# Patient Record
Sex: FEMALE | Race: WHITE
[De-identification: names, ages, dates, MRNs, and addresses within clinical notes are randomized per-mention and may not be internally consistent; named-entity substitution may affect disease eponyms.]

---

## 2017-03-27 ENCOUNTER — APPOINTMENT (OUTPATIENT)
Dept: ELECTROPHYSIOLOGY | Facility: CLINIC | Age: 77
End: 2017-03-27

## 2017-04-06 ENCOUNTER — APPOINTMENT (OUTPATIENT)
Dept: ELECTROPHYSIOLOGY | Facility: CLINIC | Age: 77
End: 2017-04-06

## 2017-04-21 ENCOUNTER — APPOINTMENT (OUTPATIENT)
Dept: ELECTROPHYSIOLOGY | Facility: CLINIC | Age: 77
End: 2017-04-21

## 2017-05-24 ENCOUNTER — APPOINTMENT (OUTPATIENT)
Dept: ELECTROPHYSIOLOGY | Facility: CLINIC | Age: 77
End: 2017-05-24

## 2017-06-06 ENCOUNTER — APPOINTMENT (OUTPATIENT)
Dept: ELECTROPHYSIOLOGY | Facility: CLINIC | Age: 77
End: 2017-06-06

## 2017-06-06 VITALS
HEIGHT: 66 IN | SYSTOLIC BLOOD PRESSURE: 160 MMHG | BODY MASS INDEX: 34.07 KG/M2 | DIASTOLIC BLOOD PRESSURE: 70 MMHG | WEIGHT: 212 LBS | HEART RATE: 68 BPM

## 2017-06-23 ENCOUNTER — APPOINTMENT (OUTPATIENT)
Dept: ELECTROPHYSIOLOGY | Facility: CLINIC | Age: 77
End: 2017-06-23

## 2017-07-24 ENCOUNTER — APPOINTMENT (OUTPATIENT)
Dept: ELECTROPHYSIOLOGY | Facility: CLINIC | Age: 77
End: 2017-07-24
Payer: MEDICARE

## 2017-07-24 PROCEDURE — 93299: CPT

## 2017-07-24 PROCEDURE — 93298 REM INTERROG DEV EVAL SCRMS: CPT

## 2017-08-22 ENCOUNTER — APPOINTMENT (OUTPATIENT)
Dept: ELECTROPHYSIOLOGY | Facility: CLINIC | Age: 77
End: 2017-08-22
Payer: MEDICARE

## 2017-08-22 PROCEDURE — 93298 REM INTERROG DEV EVAL SCRMS: CPT

## 2017-08-22 PROCEDURE — 93299: CPT

## 2017-09-21 ENCOUNTER — APPOINTMENT (OUTPATIENT)
Dept: ELECTROPHYSIOLOGY | Facility: CLINIC | Age: 77
End: 2017-09-21
Payer: MEDICARE

## 2017-09-21 PROCEDURE — 93298 REM INTERROG DEV EVAL SCRMS: CPT

## 2017-09-21 PROCEDURE — 93299: CPT

## 2017-10-23 ENCOUNTER — APPOINTMENT (OUTPATIENT)
Dept: ELECTROPHYSIOLOGY | Facility: CLINIC | Age: 77
End: 2017-10-23
Payer: MEDICARE

## 2017-10-23 PROCEDURE — 93299: CPT

## 2017-10-23 PROCEDURE — 93298 REM INTERROG DEV EVAL SCRMS: CPT

## 2017-11-21 ENCOUNTER — APPOINTMENT (OUTPATIENT)
Dept: ELECTROPHYSIOLOGY | Facility: CLINIC | Age: 77
End: 2017-11-21
Payer: MEDICARE

## 2017-11-21 PROCEDURE — 93298 REM INTERROG DEV EVAL SCRMS: CPT

## 2017-11-21 PROCEDURE — 93299: CPT

## 2017-12-12 ENCOUNTER — APPOINTMENT (OUTPATIENT)
Dept: ELECTROPHYSIOLOGY | Facility: CLINIC | Age: 77
End: 2017-12-12
Payer: MEDICARE

## 2017-12-12 VITALS — HEART RATE: 72 BPM | WEIGHT: 212 LBS | HEIGHT: 66 IN | BODY MASS INDEX: 34.07 KG/M2

## 2017-12-12 PROCEDURE — 93290 INTERROG DEV EVAL ICPMS IP: CPT

## 2017-12-21 ENCOUNTER — APPOINTMENT (OUTPATIENT)
Dept: ELECTROPHYSIOLOGY | Facility: CLINIC | Age: 77
End: 2017-12-21
Payer: MEDICARE

## 2017-12-21 DIAGNOSIS — Z95.818 PRESENCE OF OTHER CARDIAC IMPLANTS AND GRAFTS: ICD-10-CM

## 2017-12-21 PROCEDURE — 93298 REM INTERROG DEV EVAL SCRMS: CPT

## 2017-12-21 PROCEDURE — 93299: CPT

## 2017-12-26 PROBLEM — Z95.818 PRESENCE OF CARDIAC DEVICE: Status: ACTIVE | Noted: 2017-03-29

## 2018-01-03 ENCOUNTER — OUTPATIENT (OUTPATIENT)
Dept: OUTPATIENT SERVICES | Facility: HOSPITAL | Age: 78
LOS: 1 days | Discharge: HOME | End: 2018-01-03
Payer: MEDICARE

## 2018-01-03 VITALS
RESPIRATION RATE: 18 BRPM | TEMPERATURE: 97 F | SYSTOLIC BLOOD PRESSURE: 138 MMHG | HEART RATE: 72 BPM | OXYGEN SATURATION: 96 % | DIASTOLIC BLOOD PRESSURE: 68 MMHG

## 2018-01-03 VITALS
HEART RATE: 74 BPM | SYSTOLIC BLOOD PRESSURE: 140 MMHG | HEIGHT: 66 IN | RESPIRATION RATE: 18 BRPM | OXYGEN SATURATION: 98 % | TEMPERATURE: 97 F | DIASTOLIC BLOOD PRESSURE: 66 MMHG | WEIGHT: 205.91 LBS

## 2018-01-03 DIAGNOSIS — R55 SYNCOPE AND COLLAPSE: ICD-10-CM

## 2018-01-03 PROCEDURE — 33282: CPT

## 2018-01-03 RX ORDER — VANCOMYCIN HCL 1 G
500 VIAL (EA) INTRAVENOUS ONCE
Qty: 0 | Refills: 0 | Status: COMPLETED | OUTPATIENT
Start: 2018-01-03 | End: 2018-01-03

## 2018-01-03 RX ADMIN — Medication 100 MILLIGRAM(S): at 07:55

## 2018-01-03 NOTE — PACU DISCHARGE NOTE - COMMENTS
Discharge instruction given to patient. Verbalized understanding. All belongings are with the patient. Left the unit at 0835.

## 2018-01-05 ENCOUNTER — OUTPATIENT (OUTPATIENT)
Dept: OUTPATIENT SERVICES | Facility: HOSPITAL | Age: 78
LOS: 1 days | Discharge: HOME | End: 2018-01-05
Payer: MEDICARE

## 2018-01-05 DIAGNOSIS — Z95.818 PRESENCE OF OTHER CARDIAC IMPLANTS AND GRAFTS: Chronic | ICD-10-CM

## 2018-01-05 DIAGNOSIS — K63.5 POLYP OF COLON: ICD-10-CM

## 2018-01-05 DIAGNOSIS — Z88.2 ALLERGY STATUS TO SULFONAMIDES: ICD-10-CM

## 2018-01-05 DIAGNOSIS — Z98.890 OTHER SPECIFIED POSTPROCEDURAL STATES: Chronic | ICD-10-CM

## 2018-01-05 DIAGNOSIS — J44.9 CHRONIC OBSTRUCTIVE PULMONARY DISEASE, UNSPECIFIED: ICD-10-CM

## 2018-01-05 DIAGNOSIS — J45.909 UNSPECIFIED ASTHMA, UNCOMPLICATED: ICD-10-CM

## 2018-01-05 DIAGNOSIS — Z01.818 ENCOUNTER FOR OTHER PREPROCEDURAL EXAMINATION: ICD-10-CM

## 2018-01-05 DIAGNOSIS — Z98.49 CATARACT EXTRACTION STATUS, UNSPECIFIED EYE: Chronic | ICD-10-CM

## 2018-01-05 DIAGNOSIS — Z12.11 ENCOUNTER FOR SCREENING FOR MALIGNANT NEOPLASM OF COLON: ICD-10-CM

## 2018-01-05 DIAGNOSIS — K57.30 DIVERTICULOSIS OF LARGE INTESTINE WITHOUT PERFORATION OR ABSCESS WITHOUT BLEEDING: ICD-10-CM

## 2018-01-05 DIAGNOSIS — Z90.49 ACQUIRED ABSENCE OF OTHER SPECIFIED PARTS OF DIGESTIVE TRACT: Chronic | ICD-10-CM

## 2018-01-05 DIAGNOSIS — Z86.010 PERSONAL HISTORY OF COLONIC POLYPS: ICD-10-CM

## 2018-01-05 DIAGNOSIS — Z88.0 ALLERGY STATUS TO PENICILLIN: ICD-10-CM

## 2018-01-05 LAB
ANION GAP SERPL CALC-SCNC: 5 MMOL/L — SIGNIFICANT CHANGE UP (ref 5–17)
BASOPHILS # BLD AUTO: 0 K/UL — SIGNIFICANT CHANGE UP (ref 0–0.2)
BASOPHILS NFR BLD AUTO: 0.9 % — SIGNIFICANT CHANGE UP (ref 0–2)
BUN SERPL-MCNC: 27 MG/DL — HIGH (ref 7–23)
CALCIUM SERPL-MCNC: 8.9 MG/DL — SIGNIFICANT CHANGE UP (ref 8.5–10.1)
CHLORIDE SERPL-SCNC: 105 MMOL/L — SIGNIFICANT CHANGE UP (ref 96–108)
CO2 SERPL-SCNC: 29 MMOL/L — SIGNIFICANT CHANGE UP (ref 22–31)
CREAT SERPL-MCNC: 0.88 MG/DL — SIGNIFICANT CHANGE UP (ref 0.5–1.3)
EOSINOPHIL # BLD AUTO: 0.1 K/UL — SIGNIFICANT CHANGE UP (ref 0–0.5)
EOSINOPHIL NFR BLD AUTO: 2.9 % — SIGNIFICANT CHANGE UP (ref 0–6)
GLUCOSE SERPL-MCNC: 96 MG/DL — SIGNIFICANT CHANGE UP (ref 70–99)
HCT VFR BLD CALC: 39.4 % — SIGNIFICANT CHANGE UP (ref 34.5–45)
HGB BLD-MCNC: 12.6 G/DL — SIGNIFICANT CHANGE UP (ref 11.5–15.5)
LYMPHOCYTES # BLD AUTO: 0.8 K/UL — LOW (ref 1–3.3)
LYMPHOCYTES # BLD AUTO: 16.6 % — SIGNIFICANT CHANGE UP (ref 13–44)
MCHC RBC-ENTMCNC: 26 PG — LOW (ref 27–34)
MCHC RBC-ENTMCNC: 31.9 GM/DL — LOW (ref 32–36)
MCV RBC AUTO: 81.4 FL — SIGNIFICANT CHANGE UP (ref 80–100)
MONOCYTES # BLD AUTO: 0.5 K/UL — SIGNIFICANT CHANGE UP (ref 0–0.9)
MONOCYTES NFR BLD AUTO: 10.4 % — SIGNIFICANT CHANGE UP (ref 2–14)
NEUTROPHILS # BLD AUTO: 3.1 K/UL — SIGNIFICANT CHANGE UP (ref 1.8–7.4)
NEUTROPHILS NFR BLD AUTO: 69.1 % — SIGNIFICANT CHANGE UP (ref 43–77)
PLATELET # BLD AUTO: 185 K/UL — SIGNIFICANT CHANGE UP (ref 150–400)
POTASSIUM SERPL-MCNC: 4.7 MMOL/L — SIGNIFICANT CHANGE UP (ref 3.5–5.3)
POTASSIUM SERPL-SCNC: 4.7 MMOL/L — SIGNIFICANT CHANGE UP (ref 3.5–5.3)
RBC # BLD: 4.84 M/UL — SIGNIFICANT CHANGE UP (ref 3.8–5.2)
RBC # FLD: 13.6 % — SIGNIFICANT CHANGE UP (ref 10.3–14.5)
SODIUM SERPL-SCNC: 139 MMOL/L — SIGNIFICANT CHANGE UP (ref 135–145)
WBC # BLD: 4.5 K/UL — SIGNIFICANT CHANGE UP (ref 3.8–10.5)
WBC # FLD AUTO: 4.5 K/UL — SIGNIFICANT CHANGE UP (ref 3.8–10.5)

## 2018-01-05 PROCEDURE — 93010 ELECTROCARDIOGRAM REPORT: CPT

## 2018-01-05 RX ORDER — ESCITALOPRAM OXALATE 10 MG/1
0 TABLET, FILM COATED ORAL
Qty: 0 | Refills: 0 | COMMUNITY

## 2018-01-05 NOTE — ASU PATIENT PROFILE, ADULT - PMH
Acid reflux    Anxiety disorder    Emphysema    Idiopathic thrombocytopenic purpura    Lymph nodes enlarged  in chest  Primary biliary cholangitis    Pure hypercholesterolemia

## 2018-01-05 NOTE — ASU PATIENT PROFILE, ADULT - PSH
H/O coronary angiogram  2012  S/P cholecystectomy  2011  S/P colonoscopy    Status post cataract surgery  b/l  Status post placement of implantable loop recorder  2014  removed Jan 2018

## 2018-01-09 ENCOUNTER — RESULT REVIEW (OUTPATIENT)
Age: 78
End: 2018-01-09

## 2018-01-09 ENCOUNTER — OUTPATIENT (OUTPATIENT)
Dept: OUTPATIENT SERVICES | Facility: HOSPITAL | Age: 78
LOS: 1 days | Discharge: HOME | End: 2018-01-09
Payer: MEDICARE

## 2018-01-09 VITALS
HEART RATE: 73 BPM | OXYGEN SATURATION: 98 % | DIASTOLIC BLOOD PRESSURE: 56 MMHG | TEMPERATURE: 98 F | WEIGHT: 205.03 LBS | RESPIRATION RATE: 15 BRPM | SYSTOLIC BLOOD PRESSURE: 117 MMHG | HEIGHT: 66 IN

## 2018-01-09 DIAGNOSIS — Z98.49 CATARACT EXTRACTION STATUS, UNSPECIFIED EYE: Chronic | ICD-10-CM

## 2018-01-09 DIAGNOSIS — Z90.49 ACQUIRED ABSENCE OF OTHER SPECIFIED PARTS OF DIGESTIVE TRACT: Chronic | ICD-10-CM

## 2018-01-09 DIAGNOSIS — Z98.890 OTHER SPECIFIED POSTPROCEDURAL STATES: Chronic | ICD-10-CM

## 2018-01-09 DIAGNOSIS — Z95.818 PRESENCE OF OTHER CARDIAC IMPLANTS AND GRAFTS: Chronic | ICD-10-CM

## 2018-01-09 PROCEDURE — 88305 TISSUE EXAM BY PATHOLOGIST: CPT | Mod: 26

## 2018-01-09 RX ORDER — SODIUM CHLORIDE 9 MG/ML
1000 INJECTION INTRAMUSCULAR; INTRAVENOUS; SUBCUTANEOUS
Qty: 0 | Refills: 0 | Status: DISCONTINUED | OUTPATIENT
Start: 2018-01-09 | End: 2018-01-24

## 2018-01-10 DIAGNOSIS — Z88.0 ALLERGY STATUS TO PENICILLIN: ICD-10-CM

## 2018-01-10 DIAGNOSIS — Z45.09 ENCOUNTER FOR ADJUSTMENT AND MANAGEMENT OF OTHER CARDIAC DEVICE: ICD-10-CM

## 2018-01-10 DIAGNOSIS — Z88.2 ALLERGY STATUS TO SULFONAMIDES: ICD-10-CM

## 2018-01-10 LAB — SURGICAL PATHOLOGY FINAL REPORT - CH: SIGNIFICANT CHANGE UP

## 2018-01-12 DIAGNOSIS — Z12.11 ENCOUNTER FOR SCREENING FOR MALIGNANT NEOPLASM OF COLON: ICD-10-CM

## 2018-01-12 DIAGNOSIS — J45.909 UNSPECIFIED ASTHMA, UNCOMPLICATED: ICD-10-CM

## 2018-01-12 DIAGNOSIS — Z88.2 ALLERGY STATUS TO SULFONAMIDES: ICD-10-CM

## 2018-01-12 DIAGNOSIS — K63.5 POLYP OF COLON: ICD-10-CM

## 2018-01-12 DIAGNOSIS — J44.9 CHRONIC OBSTRUCTIVE PULMONARY DISEASE, UNSPECIFIED: ICD-10-CM

## 2018-01-12 DIAGNOSIS — I10 ESSENTIAL (PRIMARY) HYPERTENSION: ICD-10-CM

## 2018-01-12 DIAGNOSIS — Z88.0 ALLERGY STATUS TO PENICILLIN: ICD-10-CM

## 2018-01-12 DIAGNOSIS — I25.10 ATHEROSCLEROTIC HEART DISEASE OF NATIVE CORONARY ARTERY WITHOUT ANGINA PECTORIS: ICD-10-CM

## 2018-01-12 DIAGNOSIS — K57.30 DIVERTICULOSIS OF LARGE INTESTINE WITHOUT PERFORATION OR ABSCESS WITHOUT BLEEDING: ICD-10-CM

## 2018-01-17 ENCOUNTER — APPOINTMENT (OUTPATIENT)
Dept: ELECTROPHYSIOLOGY | Facility: CLINIC | Age: 78
End: 2018-01-17
Payer: MEDICARE

## 2018-01-17 VITALS
WEIGHT: 210 LBS | SYSTOLIC BLOOD PRESSURE: 127 MMHG | HEART RATE: 72 BPM | BODY MASS INDEX: 33.75 KG/M2 | DIASTOLIC BLOOD PRESSURE: 63 MMHG | HEIGHT: 66 IN

## 2018-01-17 DIAGNOSIS — R55 SYNCOPE AND COLLAPSE: ICD-10-CM

## 2018-01-17 PROCEDURE — 99024 POSTOP FOLLOW-UP VISIT: CPT

## 2018-04-25 ENCOUNTER — RESULT REVIEW (OUTPATIENT)
Age: 78
End: 2018-04-25

## 2018-09-13 PROBLEM — D69.3 IMMUNE THROMBOCYTOPENIC PURPURA: Chronic | Status: ACTIVE | Noted: 2018-01-03

## 2018-09-13 PROBLEM — K21.9 GASTRO-ESOPHAGEAL REFLUX DISEASE WITHOUT ESOPHAGITIS: Chronic | Status: ACTIVE | Noted: 2018-01-03

## 2018-09-13 PROBLEM — R59.9 ENLARGED LYMPH NODES, UNSPECIFIED: Chronic | Status: ACTIVE | Noted: 2018-01-05

## 2018-09-13 PROBLEM — K74.3 PRIMARY BILIARY CIRRHOSIS: Chronic | Status: ACTIVE | Noted: 2018-01-05

## 2018-09-13 PROBLEM — E78.00 PURE HYPERCHOLESTEROLEMIA, UNSPECIFIED: Chronic | Status: ACTIVE | Noted: 2018-01-05

## 2018-09-13 PROBLEM — F41.9 ANXIETY DISORDER, UNSPECIFIED: Chronic | Status: ACTIVE | Noted: 2018-01-05

## 2018-09-13 PROBLEM — J43.9 EMPHYSEMA, UNSPECIFIED: Chronic | Status: ACTIVE | Noted: 2018-01-03

## 2018-09-19 ENCOUNTER — OUTPATIENT (OUTPATIENT)
Dept: OUTPATIENT SERVICES | Facility: HOSPITAL | Age: 78
LOS: 1 days | End: 2018-09-19
Payer: MEDICARE

## 2018-09-19 ENCOUNTER — APPOINTMENT (OUTPATIENT)
Dept: CT IMAGING | Facility: CLINIC | Age: 78
End: 2018-09-19

## 2018-09-19 DIAGNOSIS — Z98.890 OTHER SPECIFIED POSTPROCEDURAL STATES: Chronic | ICD-10-CM

## 2018-09-19 DIAGNOSIS — Z90.49 ACQUIRED ABSENCE OF OTHER SPECIFIED PARTS OF DIGESTIVE TRACT: Chronic | ICD-10-CM

## 2018-09-19 DIAGNOSIS — Z98.49 CATARACT EXTRACTION STATUS, UNSPECIFIED EYE: Chronic | ICD-10-CM

## 2018-09-19 DIAGNOSIS — Z00.8 ENCOUNTER FOR OTHER GENERAL EXAMINATION: ICD-10-CM

## 2018-09-19 DIAGNOSIS — Z95.818 PRESENCE OF OTHER CARDIAC IMPLANTS AND GRAFTS: Chronic | ICD-10-CM

## 2018-09-19 PROCEDURE — 71250 CT THORAX DX C-: CPT | Mod: 26

## 2018-09-19 PROCEDURE — 71250 CT THORAX DX C-: CPT

## 2019-11-21 NOTE — ASU PATIENT PROFILE, ADULT - NS PRO CESSATION MED OFFERED
Please let pt know that her mammogram shows dense breasts.  Dense breasts may hide some masses on a mammogram.  We recommend a sono cine as a better way to look more closely at the breast tissue and determine if any masses or nodules are present.  Typically insurance does not pay for this test.  It costs about $125.00.  Let me know if she would like me to order this test.    
Pt informed- pt states she may do it next year  
contraindicated

## 2019-12-03 ENCOUNTER — APPOINTMENT (OUTPATIENT)
Dept: CT IMAGING | Facility: CLINIC | Age: 79
End: 2019-12-03
Payer: MEDICARE

## 2019-12-03 ENCOUNTER — OUTPATIENT (OUTPATIENT)
Dept: OUTPATIENT SERVICES | Facility: HOSPITAL | Age: 79
LOS: 1 days | End: 2019-12-03
Payer: MEDICARE

## 2019-12-03 DIAGNOSIS — Z95.818 PRESENCE OF OTHER CARDIAC IMPLANTS AND GRAFTS: Chronic | ICD-10-CM

## 2019-12-03 DIAGNOSIS — Z90.49 ACQUIRED ABSENCE OF OTHER SPECIFIED PARTS OF DIGESTIVE TRACT: Chronic | ICD-10-CM

## 2019-12-03 DIAGNOSIS — Z00.8 ENCOUNTER FOR OTHER GENERAL EXAMINATION: ICD-10-CM

## 2019-12-03 DIAGNOSIS — Z98.49 CATARACT EXTRACTION STATUS, UNSPECIFIED EYE: Chronic | ICD-10-CM

## 2019-12-03 DIAGNOSIS — Z98.890 OTHER SPECIFIED POSTPROCEDURAL STATES: Chronic | ICD-10-CM

## 2019-12-03 PROCEDURE — 71250 CT THORAX DX C-: CPT

## 2019-12-03 PROCEDURE — 71250 CT THORAX DX C-: CPT | Mod: 26

## 2020-02-16 ENCOUNTER — EMERGENCY (EMERGENCY)
Facility: HOSPITAL | Age: 80
LOS: 0 days | Discharge: HOME | End: 2020-02-16
Attending: EMERGENCY MEDICINE
Payer: MEDICARE

## 2020-02-16 ENCOUNTER — TRANSCRIPTION ENCOUNTER (OUTPATIENT)
Age: 80
End: 2020-02-16

## 2020-02-16 VITALS
RESPIRATION RATE: 18 BRPM | TEMPERATURE: 97 F | DIASTOLIC BLOOD PRESSURE: 76 MMHG | OXYGEN SATURATION: 97 % | SYSTOLIC BLOOD PRESSURE: 146 MMHG | HEART RATE: 84 BPM

## 2020-02-16 VITALS — WEIGHT: 229.94 LBS

## 2020-02-16 DIAGNOSIS — M79.661 PAIN IN RIGHT LOWER LEG: ICD-10-CM

## 2020-02-16 DIAGNOSIS — Z90.49 ACQUIRED ABSENCE OF OTHER SPECIFIED PARTS OF DIGESTIVE TRACT: ICD-10-CM

## 2020-02-16 DIAGNOSIS — Z98.890 OTHER SPECIFIED POSTPROCEDURAL STATES: Chronic | ICD-10-CM

## 2020-02-16 DIAGNOSIS — K83.09 OTHER CHOLANGITIS: ICD-10-CM

## 2020-02-16 DIAGNOSIS — Z88.2 ALLERGY STATUS TO SULFONAMIDES: ICD-10-CM

## 2020-02-16 DIAGNOSIS — E78.00 PURE HYPERCHOLESTEROLEMIA, UNSPECIFIED: ICD-10-CM

## 2020-02-16 DIAGNOSIS — R41.9 UNSPECIFIED SYMPTOMS AND SIGNS INVOLVING COGNITIVE FUNCTIONS AND AWARENESS: ICD-10-CM

## 2020-02-16 DIAGNOSIS — Z88.0 ALLERGY STATUS TO PENICILLIN: ICD-10-CM

## 2020-02-16 DIAGNOSIS — J43.9 EMPHYSEMA, UNSPECIFIED: ICD-10-CM

## 2020-02-16 DIAGNOSIS — Z98.49 CATARACT EXTRACTION STATUS, UNSPECIFIED EYE: Chronic | ICD-10-CM

## 2020-02-16 DIAGNOSIS — Z95.818 PRESENCE OF OTHER CARDIAC IMPLANTS AND GRAFTS: Chronic | ICD-10-CM

## 2020-02-16 DIAGNOSIS — Z90.49 ACQUIRED ABSENCE OF OTHER SPECIFIED PARTS OF DIGESTIVE TRACT: Chronic | ICD-10-CM

## 2020-02-16 DIAGNOSIS — D69.3 IMMUNE THROMBOCYTOPENIC PURPURA: ICD-10-CM

## 2020-02-16 DIAGNOSIS — K21.9 GASTRO-ESOPHAGEAL REFLUX DISEASE WITHOUT ESOPHAGITIS: ICD-10-CM

## 2020-02-16 PROCEDURE — 99284 EMERGENCY DEPT VISIT MOD MDM: CPT | Mod: 25

## 2020-02-16 PROCEDURE — 93971 EXTREMITY STUDY: CPT | Mod: RT

## 2020-02-16 PROCEDURE — 93971 EXTREMITY STUDY: CPT | Mod: 26,RT

## 2020-02-16 PROCEDURE — 99284 EMERGENCY DEPT VISIT MOD MDM: CPT

## 2020-02-16 NOTE — ED STATDOCS - PATIENT PORTAL LINK FT
You can access the FollowMyHealth Patient Portal offered by [facility] by registering at the following website: [email or web address]. By joining [employer]’s FollowMyHealth portal, you will also be able to view your health information using other applications (apps) compatible with our system.

## 2020-02-16 NOTE — ED STATDOCS - OBJECTIVE STATEMENT
80 y/o female with a PMHx of arthritis, GERD, HLD, ITP presents to the ED c/o right calf pain since yesterday. Pt states pain started after standing up from a chair. Denies redness, swelling. Pt states she has a h/o arthritis, has been taking meloxicam. PMD- Dr. Neumann. Ortho- PA David Lopez at [facility].

## 2020-02-16 NOTE — ED STATDOCS - CLINICAL SUMMARY MEDICAL DECISION MAKING FREE TEXT BOX
signed Anne Byrnes PA-C Pt seen initially in intake by Dr Brennan.   79F c/o posterior right calf pain since yesterday while that she noticed when she stood up to get a second helping of food during a meal. Pain is better now, pt able to ambulate. Did not feel a snap or pop. Possibly low grade strain. Recommend NSAIDs, rest, outpt f/u PMD or ortho. Pt feeling well, pt and family agree with DC and plan of care.

## 2020-02-16 NOTE — ED STATDOCS - NSFOLLOWUPINSTRUCTIONS_ED_ALL_ED_FT
Muscle Strain    WHAT YOU NEED TO KNOW:    A muscle strain is a twist, pull, or tear of a muscle or tendon. A tendon is a strong elastic tissue that connects a muscle to a bone. Signs of a strained muscle include bruising and swelling over the area, pain with movement, and loss of strength.          DISCHARGE INSTRUCTIONS:    Return to the emergency department if:     You suddenly cannot feel or move your injured muscle.        Contact your healthcare provider if:     Your pain and swelling worsen or do not go away.       You have questions or concerns about your condition or care.    Medicines:     NSAIDs help decrease swelling and pain or fever. This medicine is available with or without a doctor's order. NSAIDs can cause stomach bleeding or kidney problems in certain people. If you take blood thinner medicine, always ask your healthcare provider if NSAIDs are safe for you. Always read the medicine label and follow directions.      Muscle relaxers help decrease pain and muscle spasms.      Take your medicine as directed. Contact your healthcare provider if you think your medicine is not helping or if you have side effects. Tell him of her if you are allergic to any medicine. Keep a list of the medicines, vitamins, and herbs you take. Include the amounts, and when and why you take them. Bring the list or the pill bottles to follow-up visits. Carry your medicine list with you in case of an emergency.    Follow up with your healthcare provider as directed: Your healthcare provider may suggest that you have a follow-up visit before you go back to your usual activity. Write down your questions so you remember to ask them during your visits.    Self-care:     3 to 7 days after the injury: Use Rest, Ice, Compression, and Elevation (RICE) to help stop bruising and decrease pain and swelling.  Rest: Rest your muscle to allow your injury to heal. When the pain decreases, begin normal, slow movements. For mild and moderate muscle strains, you should rest your muscles for about 2 days. However, if you have a severe muscle strain, you should rest for 10 to 14 days. You may need to use crutches to walk if your muscle strain is in your legs or lower body.       Ice: Put an ice pack on the injured area. Put a towel between the ice pack and your skin. Do not put the ice pack directly on your skin. You can use a package of frozen peas instead of an ice pack.      Compression: You may need to wrap an elastic bandage around the area to decrease swelling. It should be tight enough for you to feel support. Do not wrap it too tightly.       Elevation: Keep the injured muscle raised above your heart if possible. For example if you have a strain of your lower leg muscle, lie down and prop your leg up on pillows. This helps decrease pain and swelling.      3 to 21 days after the injury: Start to slowly and regularly exercise your muscle. This will help it heal. If you feel pain, decrease how hard you are exercising.       1 to 6 weeks after the injury: Stretch the injured muscle. Hold the stretch for about 30 seconds. Do this 4 times a day. You may stretch the muscle until you feel a slight pull. Stop stretching if you feel pain.       2 weeks to 6 months after the injury: The goal of this phase is to return to the activity you were doing before the injury happened, without hurting the muscle again.       3 weeks to 6 months after the injury: Keep stretching and strengthening your muscles to avoid injury. Slowly increase the time and distance that you exercise. You may have signs and symptoms of muscle strain 6 months after the injury, even if you do things to help it heal. In this case, you may need surgery on the muscle.     FOLLOW UP WITH YOUR DOCTOR IN 5-7 DAYS IF STILL HAVING PAIN. RETURN TO ER FOR ANY WORSENING SYMPTOMS OR NEW CONCERNS.

## 2020-02-16 NOTE — ED ADULT NURSE NOTE - OBJECTIVE STATEMENT
pt came to the ED for eval of R leg pain which began yesterday suddenly when pt went from sitting to standing from her chair. Pt denies fall/trauma to leg.

## 2020-02-16 NOTE — ED ADULT NURSE NOTE - NSIMPLEMENTINTERV_GEN_ALL_ED
Implemented All Universal Safety Interventions:  [place] to call system. Call bell, personal items and telephone within reach. Instruct patient to call for assistance. Room bathroom lighting operational. Non-slip footwear when patient is off stretcher. Physically safe environment: no spills, clutter or unnecessary equipment. Stretcher in lowest position, wheels locked, appropriate side rails in place.

## 2020-02-16 NOTE — ED STATDOCS - MUSCULOSKELETAL, MLM
range of motion is not limited and there is no muscle tenderness. No reproducible calf TTP, no edema.

## 2020-02-16 NOTE — ED ADULT TRIAGE NOTE - CHIEF COMPLAINT QUOTE
pt c/o right  lower leg pain since yestwrerday that started when she went to stand up from a chair. pt denies fall or trauma. pt denies swelling ot denies hx of DVT

## 2020-10-29 ENCOUNTER — APPOINTMENT (OUTPATIENT)
Dept: CT IMAGING | Facility: CLINIC | Age: 80
End: 2020-10-29
Payer: MEDICARE

## 2020-10-29 ENCOUNTER — OUTPATIENT (OUTPATIENT)
Dept: OUTPATIENT SERVICES | Facility: HOSPITAL | Age: 80
LOS: 1 days | End: 2020-10-29
Payer: MEDICARE

## 2020-10-29 DIAGNOSIS — Z98.890 OTHER SPECIFIED POSTPROCEDURAL STATES: Chronic | ICD-10-CM

## 2020-10-29 DIAGNOSIS — Z90.49 ACQUIRED ABSENCE OF OTHER SPECIFIED PARTS OF DIGESTIVE TRACT: Chronic | ICD-10-CM

## 2020-10-29 DIAGNOSIS — R06.00 DYSPNEA, UNSPECIFIED: ICD-10-CM

## 2020-10-29 DIAGNOSIS — Z95.818 PRESENCE OF OTHER CARDIAC IMPLANTS AND GRAFTS: Chronic | ICD-10-CM

## 2020-10-29 DIAGNOSIS — Z98.49 CATARACT EXTRACTION STATUS, UNSPECIFIED EYE: Chronic | ICD-10-CM

## 2020-10-29 PROCEDURE — 71250 CT THORAX DX C-: CPT

## 2020-10-29 PROCEDURE — 71250 CT THORAX DX C-: CPT | Mod: 26

## 2021-04-15 ENCOUNTER — APPOINTMENT (OUTPATIENT)
Dept: MRI IMAGING | Facility: CLINIC | Age: 81
End: 2021-04-15
Payer: MEDICARE

## 2021-04-15 ENCOUNTER — OUTPATIENT (OUTPATIENT)
Dept: OUTPATIENT SERVICES | Facility: HOSPITAL | Age: 81
LOS: 1 days | End: 2021-04-15
Payer: MEDICARE

## 2021-04-15 DIAGNOSIS — Z95.818 PRESENCE OF OTHER CARDIAC IMPLANTS AND GRAFTS: Chronic | ICD-10-CM

## 2021-04-15 DIAGNOSIS — Z90.49 ACQUIRED ABSENCE OF OTHER SPECIFIED PARTS OF DIGESTIVE TRACT: Chronic | ICD-10-CM

## 2021-04-15 DIAGNOSIS — Z98.49 CATARACT EXTRACTION STATUS, UNSPECIFIED EYE: Chronic | ICD-10-CM

## 2021-04-15 DIAGNOSIS — Z98.890 OTHER SPECIFIED POSTPROCEDURAL STATES: Chronic | ICD-10-CM

## 2021-04-15 DIAGNOSIS — Z00.8 ENCOUNTER FOR OTHER GENERAL EXAMINATION: ICD-10-CM

## 2021-04-15 PROCEDURE — 73721 MRI JNT OF LWR EXTRE W/O DYE: CPT | Mod: 26,RT,MH

## 2021-04-15 PROCEDURE — 72148 MRI LUMBAR SPINE W/O DYE: CPT | Mod: 26,MH

## 2021-04-15 PROCEDURE — 73721 MRI JNT OF LWR EXTRE W/O DYE: CPT

## 2021-04-15 PROCEDURE — 72148 MRI LUMBAR SPINE W/O DYE: CPT

## 2021-05-11 ENCOUNTER — OUTPATIENT (OUTPATIENT)
Dept: OUTPATIENT SERVICES | Facility: HOSPITAL | Age: 81
LOS: 1 days | End: 2021-05-11
Payer: MEDICARE

## 2021-05-11 ENCOUNTER — APPOINTMENT (OUTPATIENT)
Dept: CT IMAGING | Facility: CLINIC | Age: 81
End: 2021-05-11
Payer: MEDICARE

## 2021-05-11 DIAGNOSIS — Z98.890 OTHER SPECIFIED POSTPROCEDURAL STATES: Chronic | ICD-10-CM

## 2021-05-11 DIAGNOSIS — Z90.49 ACQUIRED ABSENCE OF OTHER SPECIFIED PARTS OF DIGESTIVE TRACT: Chronic | ICD-10-CM

## 2021-05-11 DIAGNOSIS — Z98.49 CATARACT EXTRACTION STATUS, UNSPECIFIED EYE: Chronic | ICD-10-CM

## 2021-05-11 DIAGNOSIS — R06.00 DYSPNEA, UNSPECIFIED: ICD-10-CM

## 2021-05-11 DIAGNOSIS — Z95.818 PRESENCE OF OTHER CARDIAC IMPLANTS AND GRAFTS: Chronic | ICD-10-CM

## 2021-05-11 PROCEDURE — 71250 CT THORAX DX C-: CPT | Mod: 26,MH

## 2021-05-11 PROCEDURE — 71250 CT THORAX DX C-: CPT

## 2021-05-26 ENCOUNTER — OUTPATIENT (OUTPATIENT)
Dept: OUTPATIENT SERVICES | Facility: HOSPITAL | Age: 81
LOS: 1 days | End: 2021-05-26
Payer: MEDICARE

## 2021-05-26 VITALS
HEART RATE: 91 BPM | WEIGHT: 233.69 LBS | TEMPERATURE: 98 F | DIASTOLIC BLOOD PRESSURE: 74 MMHG | RESPIRATION RATE: 18 BRPM | OXYGEN SATURATION: 98 % | SYSTOLIC BLOOD PRESSURE: 115 MMHG | HEIGHT: 65.5 IN

## 2021-05-26 DIAGNOSIS — M16.11 UNILATERAL PRIMARY OSTEOARTHRITIS, RIGHT HIP: ICD-10-CM

## 2021-05-26 DIAGNOSIS — Z98.890 OTHER SPECIFIED POSTPROCEDURAL STATES: Chronic | ICD-10-CM

## 2021-05-26 DIAGNOSIS — Z01.818 ENCOUNTER FOR OTHER PREPROCEDURAL EXAMINATION: ICD-10-CM

## 2021-05-26 DIAGNOSIS — Z90.49 ACQUIRED ABSENCE OF OTHER SPECIFIED PARTS OF DIGESTIVE TRACT: Chronic | ICD-10-CM

## 2021-05-26 DIAGNOSIS — Z95.818 PRESENCE OF OTHER CARDIAC IMPLANTS AND GRAFTS: Chronic | ICD-10-CM

## 2021-05-26 DIAGNOSIS — Z98.49 CATARACT EXTRACTION STATUS, UNSPECIFIED EYE: Chronic | ICD-10-CM

## 2021-05-26 LAB
A1C WITH ESTIMATED AVERAGE GLUCOSE RESULT: 5.8 % — HIGH (ref 4–5.6)
ALBUMIN SERPL ELPH-MCNC: 3.2 G/DL — LOW (ref 3.3–5)
ANION GAP SERPL CALC-SCNC: 3 MMOL/L — LOW (ref 5–17)
APPEARANCE UR: CLEAR — SIGNIFICANT CHANGE UP
APTT BLD: 34.9 SEC — SIGNIFICANT CHANGE UP (ref 27.5–35.5)
BASOPHILS # BLD AUTO: 0.03 K/UL — SIGNIFICANT CHANGE UP (ref 0–0.2)
BASOPHILS NFR BLD AUTO: 0.5 % — SIGNIFICANT CHANGE UP (ref 0–2)
BILIRUB UR-MCNC: NEGATIVE — SIGNIFICANT CHANGE UP
BUN SERPL-MCNC: 26 MG/DL — HIGH (ref 7–23)
CALCIUM SERPL-MCNC: 9.3 MG/DL — SIGNIFICANT CHANGE UP (ref 8.5–10.1)
CHLORIDE SERPL-SCNC: 104 MMOL/L — SIGNIFICANT CHANGE UP (ref 96–108)
CO2 SERPL-SCNC: 31 MMOL/L — SIGNIFICANT CHANGE UP (ref 22–31)
COLOR SPEC: YELLOW — SIGNIFICANT CHANGE UP
CREAT SERPL-MCNC: 0.89 MG/DL — SIGNIFICANT CHANGE UP (ref 0.5–1.3)
DIFF PNL FLD: ABNORMAL
EOSINOPHIL # BLD AUTO: 0.28 K/UL — SIGNIFICANT CHANGE UP (ref 0–0.5)
EOSINOPHIL NFR BLD AUTO: 4.4 % — SIGNIFICANT CHANGE UP (ref 0–6)
ESTIMATED AVERAGE GLUCOSE: 120 MG/DL — HIGH (ref 68–114)
GLUCOSE SERPL-MCNC: 94 MG/DL — SIGNIFICANT CHANGE UP (ref 70–99)
GLUCOSE UR QL: NEGATIVE MG/DL — SIGNIFICANT CHANGE UP
HCT VFR BLD CALC: 37.7 % — SIGNIFICANT CHANGE UP (ref 34.5–45)
HGB BLD-MCNC: 11.5 G/DL — SIGNIFICANT CHANGE UP (ref 11.5–15.5)
IMM GRANULOCYTES NFR BLD AUTO: 0.6 % — SIGNIFICANT CHANGE UP (ref 0–1.5)
INR BLD: 1.15 RATIO — SIGNIFICANT CHANGE UP (ref 0.88–1.16)
KETONES UR-MCNC: ABNORMAL
LEUKOCYTE ESTERASE UR-ACNC: ABNORMAL
LYMPHOCYTES # BLD AUTO: 0.8 K/UL — LOW (ref 1–3.3)
LYMPHOCYTES # BLD AUTO: 12.4 % — LOW (ref 13–44)
MCHC RBC-ENTMCNC: 25.2 PG — LOW (ref 27–34)
MCHC RBC-ENTMCNC: 30.5 GM/DL — LOW (ref 32–36)
MCV RBC AUTO: 82.7 FL — SIGNIFICANT CHANGE UP (ref 80–100)
MONOCYTES # BLD AUTO: 0.56 K/UL — SIGNIFICANT CHANGE UP (ref 0–0.9)
MONOCYTES NFR BLD AUTO: 8.7 % — SIGNIFICANT CHANGE UP (ref 2–14)
MRSA PCR RESULT.: SIGNIFICANT CHANGE UP
NEUTROPHILS # BLD AUTO: 4.72 K/UL — SIGNIFICANT CHANGE UP (ref 1.8–7.4)
NEUTROPHILS NFR BLD AUTO: 73.4 % — SIGNIFICANT CHANGE UP (ref 43–77)
NITRITE UR-MCNC: NEGATIVE — SIGNIFICANT CHANGE UP
PH UR: 5 — SIGNIFICANT CHANGE UP (ref 5–8)
PLATELET # BLD AUTO: 242 K/UL — SIGNIFICANT CHANGE UP (ref 150–400)
POTASSIUM SERPL-MCNC: 5 MMOL/L — SIGNIFICANT CHANGE UP (ref 3.5–5.3)
POTASSIUM SERPL-SCNC: 5 MMOL/L — SIGNIFICANT CHANGE UP (ref 3.5–5.3)
PROT UR-MCNC: 30 MG/DL
PROTHROM AB SERPL-ACNC: 13.2 SEC — SIGNIFICANT CHANGE UP (ref 10.6–13.6)
RBC # BLD: 4.56 M/UL — SIGNIFICANT CHANGE UP (ref 3.8–5.2)
RBC # FLD: 16.1 % — HIGH (ref 10.3–14.5)
S AUREUS DNA NOSE QL NAA+PROBE: SIGNIFICANT CHANGE UP
SODIUM SERPL-SCNC: 138 MMOL/L — SIGNIFICANT CHANGE UP (ref 135–145)
SP GR SPEC: 1.02 — SIGNIFICANT CHANGE UP (ref 1.01–1.02)
UROBILINOGEN FLD QL: 1 MG/DL
WBC # BLD: 6.43 K/UL — SIGNIFICANT CHANGE UP (ref 3.8–10.5)
WBC # FLD AUTO: 6.43 K/UL — SIGNIFICANT CHANGE UP (ref 3.8–10.5)

## 2021-05-26 PROCEDURE — 87640 STAPH A DNA AMP PROBE: CPT

## 2021-05-26 PROCEDURE — 80048 BASIC METABOLIC PNL TOTAL CA: CPT

## 2021-05-26 PROCEDURE — 36415 COLL VENOUS BLD VENIPUNCTURE: CPT

## 2021-05-26 PROCEDURE — 87641 MR-STAPH DNA AMP PROBE: CPT

## 2021-05-26 PROCEDURE — 82040 ASSAY OF SERUM ALBUMIN: CPT

## 2021-05-26 PROCEDURE — 83036 HEMOGLOBIN GLYCOSYLATED A1C: CPT

## 2021-05-26 PROCEDURE — 93005 ELECTROCARDIOGRAM TRACING: CPT

## 2021-05-26 PROCEDURE — 93010 ELECTROCARDIOGRAM REPORT: CPT

## 2021-05-26 PROCEDURE — 85730 THROMBOPLASTIN TIME PARTIAL: CPT

## 2021-05-26 PROCEDURE — 85025 COMPLETE CBC W/AUTO DIFF WBC: CPT

## 2021-05-26 PROCEDURE — 86900 BLOOD TYPING SEROLOGIC ABO: CPT

## 2021-05-26 PROCEDURE — 85610 PROTHROMBIN TIME: CPT

## 2021-05-26 PROCEDURE — 86850 RBC ANTIBODY SCREEN: CPT

## 2021-05-26 PROCEDURE — 86901 BLOOD TYPING SEROLOGIC RH(D): CPT

## 2021-05-26 PROCEDURE — 81001 URINALYSIS AUTO W/SCOPE: CPT

## 2021-05-26 RX ORDER — ATORVASTATIN CALCIUM 80 MG/1
1 TABLET, FILM COATED ORAL
Qty: 0 | Refills: 0 | DISCHARGE

## 2021-05-26 NOTE — H&P PST ADULT - HISTORY OF PRESENT ILLNESS
81 year old woman presents to [facility] for preprocedure exam. Patient is for planned right THR with Dr Bravo on 6/10. Patient with no acute complaints. She uses cane for ambulation. She does not take any medication for pain.

## 2021-05-26 NOTE — H&P PST ADULT - ABILITY TO HEAR (WITH HEARING AID OR HEARING APPLIANCE IF NORMALLY USED):
deaf on the right side- but does not use hearing aide/Mildly to Moderately Impaired: difficulty hearing in some environments or speaker may need to increase volume or speak distinctly

## 2021-05-26 NOTE — H&P PST ADULT - NSICDXPASTMEDICALHX_GEN_ALL_CORE_FT
PAST MEDICAL HISTORY:  Acid reflux     Anxiety disorder     Emphysema     Idiopathic thrombocytopenic purpura     Lymph nodes enlarged in chest    Mild emphysema     Osteoarthritis     Primary biliary cholangitis     Pulmonary nodule     Pure hypercholesterolemia     Spinal stenosis

## 2021-05-26 NOTE — H&P PST ADULT - NSICDXPASTSURGICALHX_GEN_ALL_CORE_FT
PAST SURGICAL HISTORY:  H/O coronary angiogram 2012    S/P cholecystectomy 2011    S/P colonoscopy     Status post cataract surgery b/l    Status post placement of implantable loop recorder 2014  removed Jan 2018

## 2021-05-26 NOTE — H&P PST ADULT - ASSESSMENT
81 year old woman presents to [facility] for preprocedure exam. Patient is for planned right THR with Dr Bravo on 6/10.         Plan:  - PST instructions given ; NPO status instructions to be given by ASU .  - Pt instructed to take following meds with sip of water : ursodiol, lexapro and omeprazole  - Pt instructed to take routine evening medications unless indicated .  -  Stop NSAIDS ( Aspirin Alev Motrin Mobic Diclofenac), herbal supplements , MVI , Vitamin fish oil 7 days prior to surgery  unless   directed by surgeon or cardiologist;   - Medical Optimization  with Dr Neumann  - EZ wash instructions given & mupirocin instructions given. Instructed to use Mupirocin on ly if he gets a phone call from PST staff   - Labs EKG CXR as per surgeon request. Chest CT done on 2021- copy in the chart  -  Pt instructed to self quarantine .  - Covid Testing scheduled       CAPRINI SCORE [CLOT]    AGE RELATED RISK FACTORS                                                       MOBILITY RELATED FACTORS  [ ] Age 41-60 years                                            (1 Point)                  [ ] Bed rest                                                        (1 Point)  [ ] Age: 61-74 years                                           (2 Points)                 [ ] Plaster cast                                                   (2 Points)  [x ] Age= 75 years                                              (3 Points)                 [ ] Bed bound for more than 72 hours                 (2 Points)    DISEASE RELATED RISK FACTORS                                               GENDER SPECIFIC FACTORS  [x ] Edema in the lower extremities                       (1 Point)                  [ ] Pregnancy                                                     (1 Point)  [ ] Varicose veins                                               (1 Point)                  [ ] Post-partum < 6 weeks                                   (1 Point)             [x ] BMI > 25 Kg/m2                                            (1 Point)                  [ ] Hormonal therapy  or oral contraception          (1 Point)                 [ ] Sepsis (in the previous month)                        (1 Point)                  [ ] History of pregnancy complications                 (1 point)  [ ] Pneumonia or serious lung disease                                               [ ] Unexplained or recurrent                     (1 Point)           (in the previous month)                               (1 Point)  [ ] Abnormal pulmonary function test                     (1 Point)                 SURGERY RELATED RISK FACTORS  [ ] Acute myocardial infarction                              (1 Point)                 [ ]  Section                                             (1 Point)  [ ] Congestive heart failure (in the previous month)  (1 Point)               [ ] Minor surgery                                                  (1 Point)   [ ] Inflammatory bowel disease                             (1 Point)                 [ ] Arthroscopic surgery                                        (2 Points)  [ ] Central venous access                                      (2 Points)                [ ] General surgery lasting more than 45 minutes   (2 Points)       [ ] Stroke (in the previous month)                          (5 Points)               [x ] Elective arthroplasty                                         (5 Points)                                                                                                                                               HEMATOLOGY RELATED FACTORS                                                 TRAUMA RELATED RISK FACTORS  [ ] Prior episodes of VTE                                     (3 Points)                [ ] Fracture of the hip, pelvis, or leg                       (5 Points)  [ ] Positive family history for VTE                         (3 Points)                 [ ] Acute spinal cord injury (in the previous month)  (5 Points)  [ ] Prothrombin 68226 A                                     (3 Points)                 [ ] Paralysis  (less than 1 month)                             (5 Points)  [ ] Factor V Leiden                                             (3 Points)                  [ ] Multiple Trauma within 1 month                        (5 Points)  [ ] Lupus anticoagulants                                     (3 Points)                                                           [ ] Anticardiolipin antibodies                               (3 Points)                                                       [ ] High homocysteine in the blood                      (3 Points)                                             [ ] Other congenital or acquired thrombophilia      (3 Points)                                                [ ] Heparin induced thrombocytopenia                  (3 Points)                                          Total Score [     10     ]    Caprini Score 0 - 2:  Low Risk, No VTE Prophylaxis required for most patients, encourage ambulation  Caprini Score 3 - 6:  At Risk, pharmacologic VTE prophylaxis is indicated for most patients (in the absence of a contraindication)  Caprini Score Greater than or = 7:  High Risk, pharmacologic VTE prophylaxis is indicated for most patients (in the absence of a contraindication)

## 2021-05-26 NOTE — H&P PST ADULT - NSANTHOSAYNRD_GEN_A_CORE
No. JACQUELYN screening performed.  STOP BANG Legend: 0-2 = LOW Risk; 3-4 = INTERMEDIATE Risk; 5-8 = HIGH Risk

## 2021-05-26 NOTE — H&P PST ADULT - PRO ARRIVE FROM
CHIEF COMPLAINT:  Chief Complaint   Patient presents with   • Pre-Op Exam     consult and clearance requested by Dr Anderson for anterior posterior repair on 11/30/20 at [facility]   • Medicare Wellness Visit     sub wellness visit        HISTORY OF PRESENT ILLNESS:  Ying Stevenson is a 72 year old female presenting for a Medicare Wellness Visit, evaluation of chronic medical conditions, and address acute medical concerns.    ---She has hypertension for which she takes nifedipine 30 mg 1 tablet by mouth once daily, Bumex 0.5 mg 1 tablet by mouth daily as needed, losartan 100 mg 1 tablet by mouth once daily, Toprol XL 50 mg 1 tablet by mouth once daily, and potassium chloride 20 mEq ER 1 tablet by mouth once daily along with Bumex with good results.  She takes the medications as prescribed, without any adverse side effects.  She does monitor her dietary sodium intake.  She denies any dizziness, syncope, headache, vision changes, slurred speech, unilateral weakness, cough, chest pain, shortness of breath, or leg edema.    ---She has dyslipidemia and coronary artery disease for which she takes Lipitor 40 mg 1 tablet by mouth nightly, Toprol XL 50 mg 1 tablet by mouth once daily, and aspirin 81 mg 1 tablet by mouth once daily with good results.  She takes the medications as prescribed, without myalgias, muscle weakness, or any other adverse side effects.  She does monitor her diet closely and stays physically active.  She is followed in Cardiology for these issues.    ---She has GERD for which she takes Protonix 40 mg 1 tablet by mouth once daily since 11/16/2020 by Dr. Awan.  She states that she has only been on the medication for the last 3 days.  She was previously prescribed omeprazole, however, it was not working for her symptoms.  She states that she does experience intermittent epigastric or left upper quadrant discomfort at times.  She denies any fever/chills, nausea/vomiting, breakthrough heartburn, or  change in bowel habits.  She will let Dr. Awan or myself know how the medication is working for her in the next couple of weeks.    ---She did get her shingles vaccine today.     ---She is willing to schedule a colonoscopy and mammogram today.       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   • Vertigo 12/2015 & 3/2016     Patient Active Problem List   Diagnosis   • Coronary atherosclerosis of autologous vein bypass graft   • Essential hypertension, benign   • Dyslipidemia   • Spinal stenosis of lumbar region   • S/P Redo CABG x 2   • Pain--Interventional Pain Management   • Mixed hyperlipidemia   • Diaphragmatic paralysis   • Exertional dyspnea   • Left arm pain   • Vertigo   • GERD without esophagitis   • Rectocele   • Prolapse of female genital organs     Past Surgical History:   Procedure Laterality Date   • Appendectomy     • Colonoscopy  11/03/2004    normal   • Colonoscopy  08/26/2010    no polyps   • Coronary artery bypass graft     • Echocardiogram     • Egd  08/26/2010   • Hysterectomy     • Place cath in lt/rt pulm art     • Removal gallbladder     • Stress echo  03/23/2018   • Stress test     • Tonsillectomy     • Tubal ligation         Current Outpatient Medications   Medication Sig Dispense Refill   • pantoprazole (PROTONIX) 40 MG tablet Take 1 tablet by mouth every morning. 30 tablet 3   • NIFEdipine CC (ADALAT CC) 30 MG 24 hr tablet Take 1 tablet by mouth every evening. 30 tablet 3   • bumetanide (BUMEX) 0.5 MG tablet TAKE 1 TABLET DAILY AS NEEDED (WEIGHT GAIN GREATER THAN 3 POUNDS IN 1 TO 2 DAYS OR 4 TO 5 POUNDS IN 3 TO 4 DAYS) (Patient taking differently: daily. ) 90 tablet 3   • gabapentin (NEURONTIN) 300 MG capsule Take 1 capsule by mouth 3 times daily. 180 capsule 0   • losartan (COZAAR) 100  MG tablet TAKE 1 TABLET DAILY 90 tablet 3   • metoPROLOL succinate (TOPROL-XL) 50 MG 24 hr tablet TAKE 1 TABLET DAILY 90 tablet 3   • KLOR-CON M 20 MEQ CR tablet TAKE 1 TABLET DAILY ALONG WITH BUMEX 90 tablet 3   • acetaminophen (TYLENOL) 500 MG tablet Take 500 mg by mouth daily as needed for Pain (for headache, leg pain).     • atorvastatin (LIPITOR) 40 MG tablet TAKE 1 TABLET NIGHTLY 90 tablet 3   • aspirin 81 MG tablet Take 1 tablet by mouth daily. 30 tablet 11   • MULTIPLE VITAMIN PO Take 1 tablet by mouth daily.      • zoster vaccine recomb adjuvanted (Shingrix) 50 MCG/0.5ML injection Inject 0.5 mLs into the muscle 1 time. Repeat dose in 2 to 6 months (unless 1 dose already given), for a total of 2 doses. 1 each 1     No current facility-administered medications for this visit.      Facility-Administered Medications Ordered in Other Visits   Medication Dose Route Frequency Provider Last Rate Last Admin   • BUPIVACAINE HCL (PF) 0.25 % IJ SOLN Pyxis Override            • LIDOCAINE HCL (PF) 1 % IJ SOLN Pyxis Override            • TRIAMCINOLONE ACETONIDE 40 MG/ML IJ SUSP Pyxis Override                ALLERGIES:   Allergen Reactions   • Celebrex [Celecoxib] RASH   • Norvasc [Amlodipine] SWELLING       Social History     Socioeconomic History   • Marital status: /Civil Union     Spouse name: Not on file   • Number of children: Not on file   • Years of education: Not on file   • Highest education level: Not on file   Occupational History   • Not on file   Social Needs   • Financial resource strain: Not on file   • Food insecurity     Worry: Not on file     Inability: Not on file   • Transportation needs     Medical: Not on file     Non-medical: Not on file   Tobacco Use   • Smoking status: Never Smoker   • Smokeless tobacco: Never Used   Substance and Sexual Activity   • Alcohol use: No     Alcohol/week: 0.0 standard drinks     Frequency: Never     Drinks per session: 1 or 2     Binge frequency: Never   • Drug  use: No   • Sexual activity: Yes     Partners: Male     Birth control/protection: Post-menopausal   Lifestyle   • Physical activity     Days per week: Not on file     Minutes per session: Not on file   • Stress: Not on file   Relationships   • Social connections     Talks on phone: Not on file     Gets together: Not on file     Attends [employer]ism service: Not on file     Active member of club or organization: Not on file     Attends meetings of clubs or organizations: Not on file     Relationship status: Not on file   • Intimate partner violence     Fear of current or ex partner: Not on file     Emotionally abused: Not on file     Physically abused: Not on file     Forced sexual activity: Not on file   Other Topics Concern   • Not on file   Social History Narrative   • Not on file     Patient is able to go about all of their activities of daily living.     Family History   Problem Relation Age of Onset   • Myocardial Infarction Mother    • Hypertension Mother    • Cancer Mother         mouth   • Myocardial Infarction Father    • Hypertension Father    • Cancer Father         lung, smoker   • Hypertension Daughter    • Sleep Apnea Daughter    • Heart disease Brother          at age 72   • Hypertension Son    • Sleep Apnea Son    • Cancer Brother         Adrenal gland ca;  age 46   • Heart disease Brother    • Heart disease Brother    • Heart disease Brother          at age 50   • Myocardial Infarction Brother    • Hypertension Daughter    • Clotting Disorder Daughter        REVIEW OF SYSTEMS:    Constitutional: Negative for unexplained weight loss, weight gain, or fatigue.   Eyes: Negative for blurry vision, loss of vision, or double vision.   Ears: Negative for loss of hearing or ringing in the ears.   Nose: Negative for sinus troubles, allergies, or bloody nose.  Mouth: Negative for mouth sores, difficulty swallowing, or voice change.   Cardiac: Negative for chest pain, pressure, tightness, palpitations,  or foot swelling. See HPI.   Respiratory: Negative for shortness of breath, wheezing, or persistent cough.  Gastrointestinal: Negative for nausea, vomiting, cramps, constipation, diarrhea, bloody stools, black stools, or change in thickness/diameter of stools. See HPI.   Genitourinary: Negative for urinary frequency, burning, loss or urine, bloody urine, weak stream or getting up at night to urinate.   Musculoskeletal: Negative for painful, swollen, or stiff joints or muscles.   Skin: Negative for skin rashes, sores, or changing spots/moles.   Psychiatric: Negative for anxiety, depression, excessive stress, or blues. Negative for feeling threatened in the home or in relationships.   Endocrine: Negative for excessive thirst, hunger or urination. Negative for cold/heat intolerance.   Neurologic: Negative for muscle weakness, slurred speech, seizures, headaches, numbness, dizziness, memory concern or problems with thinking or problem solving.   Breasts: Negative for breast lumps or nipple discharge.  GYN: Negative for painful periods, vaginal spotting, or excessive vaginal bleeding. Negative for sexual difficulties.        OBJECTIVE:  VITAL SIGNS:    Visit Vitals  /64 (BP Location: RUE - Right upper extremity, Patient Position: Sitting, Cuff Size: Large Adult)   Pulse 82   Resp 18   Ht 5' 1\" (1.549 m)   Wt 79.8 kg   SpO2 97%   BMI 33.25 kg/m²       No exam data present    GENERAL:  Ying Stevenson is a 72 year old female who is well developed and well nourished.  She is in no acute distress and appears comfortable at rest.  HEENT:  Normocephalic, atraumatic.  Pupils are equal, round and reactive to light.  Extraocular muscles are intact.  Ears are clear.  Membranes are clear.  Ear canals are normal.  Oropharynx is moist without oral lesions.  NECK:  Neck is supple without jugular venous distention, bruit or thyromegaly. Trachea is midline.  Neck has normal range of motion.  BREASTS:  Not done, exam  deferred.  LUNGS:  Lungs are clear to auscultation bilaterally.  There are no crackles or wheezes. Respiratory effort is normal.  HEART:  Heart has a regular rate and rhythm.  I do not appreciate any murmurs, rubs or gallops.  ABDOMEN:  Abdomen is soft, flat, nontender and nondistended.  Bowel sounds are present.  No masses are appreciated.  There is no hepatosplenomegaly.  There is no rebound or guarding.  GENITOURINARY:RECTAL:  Not done, exam deferred.  SKIN:  Skin is warm and dry.  There is no rash or suspicious lesion.  PULSES:  Radial, posterior tibial and dorsalis pedis pulses are palpable and symmetric bilaterally.  NEUROLOGIC:  Alert and oriented x3.  Cranial nerves II through XII are intact. Deep tendon reflexes are symmetric throughout.  Up and Go test is normal.  EXTREMITIES: No edema, cyanosis, or clubbing.       ASSESSMENT/PLAN:  Reviewed Medicare health risk assessment and Medicare patient intake form. Personalized prevention plan completed, printed, and given to the patient.    1. Medicare annual wellness visit, subsequent  Discussed health maintenance, including regular aerobic exercise, low fat diet, and periodic exams.  Mammogram - Patient to schedule an appointment.   Colonoscopy - Patient to schedule an appointment.   Vaccinations up to date with the exception of Shingrix - given today.     2. Essential hypertension, benign  - Controlled, continue taking:  - Nifedipine 30 mg one po qd  - Bumex 0.5 mg one po qd prn  - Losartan 100 mg one po qd  - Toprol XL 50 mg one po qd  - KCL CR 20 mEQ one po qd with Bumex  - Follow up in Cardiology as scheduled    3. Dyslipidemia  - Continue taking:  - Lipitor 40 mg one po qd  - Repeat lipid and liver panel per Dr. Awan  - Follow up in Cardiology as scheduled    4. Atherosclerosis of autologous vein coronary artery bypass graft, angina presence unspecified  - As above  - Continue taking:  - Aspirin 81 mg one po qd  - Follow up in Cardiology as  scheduled    5. GERD without esophagitis  - Continue taking:  - Protonix 40 mg one po qd  - Discussed with patient that she should contact Dr. Awan or myself if the Protonix does not seem to be working well for her. Patient expresses understanding.         [facility]  [address] [place] [address]  phone [phone]  fax [phone]      Supervising Physician: Dr. Constance Zepeda     home

## 2021-05-27 DIAGNOSIS — Z01.818 ENCOUNTER FOR OTHER PREPROCEDURAL EXAMINATION: ICD-10-CM

## 2021-05-27 DIAGNOSIS — M16.11 UNILATERAL PRIMARY OSTEOARTHRITIS, RIGHT HIP: ICD-10-CM

## 2021-05-27 PROBLEM — M19.90 UNSPECIFIED OSTEOARTHRITIS, UNSPECIFIED SITE: Chronic | Status: ACTIVE | Noted: 2021-05-26

## 2021-05-27 PROBLEM — R91.1 SOLITARY PULMONARY NODULE: Chronic | Status: ACTIVE | Noted: 2021-05-26

## 2021-05-27 PROBLEM — J43.9 EMPHYSEMA, UNSPECIFIED: Chronic | Status: ACTIVE | Noted: 2021-05-26

## 2021-05-27 PROBLEM — M48.00 SPINAL STENOSIS, SITE UNSPECIFIED: Chronic | Status: ACTIVE | Noted: 2021-05-26

## 2021-06-01 ENCOUNTER — APPOINTMENT (OUTPATIENT)
Dept: ORTHOPEDIC SURGERY | Facility: CLINIC | Age: 81
End: 2021-06-01

## 2021-06-03 ENCOUNTER — APPOINTMENT (OUTPATIENT)
Dept: ORTHOPEDIC SURGERY | Facility: CLINIC | Age: 81
End: 2021-06-03

## 2021-06-06 DIAGNOSIS — Z01.818 ENCOUNTER FOR OTHER PREPROCEDURAL EXAMINATION: ICD-10-CM

## 2021-06-07 ENCOUNTER — APPOINTMENT (OUTPATIENT)
Dept: DISASTER EMERGENCY | Facility: CLINIC | Age: 81
End: 2021-06-07

## 2021-06-08 ENCOUNTER — APPOINTMENT (OUTPATIENT)
Dept: DISASTER EMERGENCY | Facility: CLINIC | Age: 81
End: 2021-06-08

## 2021-06-09 LAB — SARS-COV-2 N GENE NPH QL NAA+PROBE: NOT DETECTED

## 2021-06-10 RX ORDER — SODIUM CHLORIDE 9 MG/ML
3 INJECTION INTRAMUSCULAR; INTRAVENOUS; SUBCUTANEOUS EVERY 8 HOURS
Refills: 0 | Status: DISCONTINUED | OUTPATIENT
Start: 2021-06-11 | End: 2021-06-13

## 2021-06-11 ENCOUNTER — TRANSCRIPTION ENCOUNTER (OUTPATIENT)
Age: 81
End: 2021-06-11

## 2021-06-11 ENCOUNTER — RESULT REVIEW (OUTPATIENT)
Age: 81
End: 2021-06-11

## 2021-06-11 ENCOUNTER — INPATIENT (INPATIENT)
Facility: HOSPITAL | Age: 81
LOS: 1 days | Discharge: HOME HEALTH | DRG: 470 | End: 2021-06-13
Attending: ORTHOPAEDIC SURGERY | Admitting: ORTHOPAEDIC SURGERY
Payer: MEDICARE

## 2021-06-11 VITALS
HEIGHT: 65.5 IN | TEMPERATURE: 98 F | HEART RATE: 95 BPM | WEIGHT: 233.69 LBS | RESPIRATION RATE: 16 BRPM | SYSTOLIC BLOOD PRESSURE: 131 MMHG | OXYGEN SATURATION: 96 % | DIASTOLIC BLOOD PRESSURE: 70 MMHG

## 2021-06-11 DIAGNOSIS — Z90.49 ACQUIRED ABSENCE OF OTHER SPECIFIED PARTS OF DIGESTIVE TRACT: Chronic | ICD-10-CM

## 2021-06-11 DIAGNOSIS — Z98.890 OTHER SPECIFIED POSTPROCEDURAL STATES: Chronic | ICD-10-CM

## 2021-06-11 DIAGNOSIS — Z95.818 PRESENCE OF OTHER CARDIAC IMPLANTS AND GRAFTS: Chronic | ICD-10-CM

## 2021-06-11 DIAGNOSIS — M16.11 UNILATERAL PRIMARY OSTEOARTHRITIS, RIGHT HIP: ICD-10-CM

## 2021-06-11 DIAGNOSIS — Z98.49 CATARACT EXTRACTION STATUS, UNSPECIFIED EYE: Chronic | ICD-10-CM

## 2021-06-11 LAB
ANION GAP SERPL CALC-SCNC: 4 MMOL/L — LOW (ref 5–17)
BUN SERPL-MCNC: 27 MG/DL — HIGH (ref 7–23)
CALCIUM SERPL-MCNC: 8.3 MG/DL — LOW (ref 8.5–10.1)
CHLORIDE SERPL-SCNC: 108 MMOL/L — SIGNIFICANT CHANGE UP (ref 96–108)
CO2 SERPL-SCNC: 29 MMOL/L — SIGNIFICANT CHANGE UP (ref 22–31)
CREAT SERPL-MCNC: 1.02 MG/DL — SIGNIFICANT CHANGE UP (ref 0.5–1.3)
GLUCOSE SERPL-MCNC: 86 MG/DL — SIGNIFICANT CHANGE UP (ref 70–99)
HCT VFR BLD CALC: 32.6 % — LOW (ref 34.5–45)
HGB BLD-MCNC: 9.9 G/DL — LOW (ref 11.5–15.5)
MCHC RBC-ENTMCNC: 25.6 PG — LOW (ref 27–34)
MCHC RBC-ENTMCNC: 30.4 GM/DL — LOW (ref 32–36)
MCV RBC AUTO: 84.2 FL — SIGNIFICANT CHANGE UP (ref 80–100)
PLATELET # BLD AUTO: 161 K/UL — SIGNIFICANT CHANGE UP (ref 150–400)
POTASSIUM SERPL-MCNC: 4.6 MMOL/L — SIGNIFICANT CHANGE UP (ref 3.5–5.3)
POTASSIUM SERPL-SCNC: 4.6 MMOL/L — SIGNIFICANT CHANGE UP (ref 3.5–5.3)
RBC # BLD: 3.87 M/UL — SIGNIFICANT CHANGE UP (ref 3.8–5.2)
RBC # FLD: 15.6 % — HIGH (ref 10.3–14.5)
SODIUM SERPL-SCNC: 141 MMOL/L — SIGNIFICANT CHANGE UP (ref 135–145)
WBC # BLD: 7.67 K/UL — SIGNIFICANT CHANGE UP (ref 3.8–10.5)
WBC # FLD AUTO: 7.67 K/UL — SIGNIFICANT CHANGE UP (ref 3.8–10.5)

## 2021-06-11 PROCEDURE — 86769 SARS-COV-2 COVID-19 ANTIBODY: CPT

## 2021-06-11 PROCEDURE — 73501 X-RAY EXAM HIP UNI 1 VIEW: CPT | Mod: 26,RT

## 2021-06-11 PROCEDURE — 97116 GAIT TRAINING THERAPY: CPT | Mod: GP

## 2021-06-11 PROCEDURE — 82962 GLUCOSE BLOOD TEST: CPT

## 2021-06-11 PROCEDURE — 85027 COMPLETE CBC AUTOMATED: CPT

## 2021-06-11 PROCEDURE — 88305 TISSUE EXAM BY PATHOLOGIST: CPT

## 2021-06-11 PROCEDURE — 97530 THERAPEUTIC ACTIVITIES: CPT | Mod: GP

## 2021-06-11 PROCEDURE — 88305 TISSUE EXAM BY PATHOLOGIST: CPT | Mod: 26

## 2021-06-11 PROCEDURE — C1713: CPT

## 2021-06-11 PROCEDURE — 80048 BASIC METABOLIC PNL TOTAL CA: CPT

## 2021-06-11 PROCEDURE — 36415 COLL VENOUS BLD VENIPUNCTURE: CPT

## 2021-06-11 PROCEDURE — C1776: CPT

## 2021-06-11 PROCEDURE — 73501 X-RAY EXAM HIP UNI 1 VIEW: CPT | Mod: RT

## 2021-06-11 PROCEDURE — 97162 PT EVAL MOD COMPLEX 30 MIN: CPT | Mod: GP

## 2021-06-11 PROCEDURE — 99221 1ST HOSP IP/OBS SF/LOW 40: CPT

## 2021-06-11 PROCEDURE — 99223 1ST HOSP IP/OBS HIGH 75: CPT

## 2021-06-11 RX ORDER — SODIUM CHLORIDE 9 MG/ML
1000 INJECTION, SOLUTION INTRAVENOUS
Refills: 0 | Status: DISCONTINUED | OUTPATIENT
Start: 2021-06-11 | End: 2021-06-11

## 2021-06-11 RX ORDER — POLYETHYLENE GLYCOL 3350 17 G/17G
17 POWDER, FOR SOLUTION ORAL
Qty: 1 | Refills: 0
Start: 2021-06-11

## 2021-06-11 RX ORDER — POLYETHYLENE GLYCOL 3350 17 G/17G
17 POWDER, FOR SOLUTION ORAL AT BEDTIME
Refills: 0 | Status: DISCONTINUED | OUTPATIENT
Start: 2021-06-11 | End: 2021-06-13

## 2021-06-11 RX ORDER — VANCOMYCIN HCL 1 G
1000 VIAL (EA) INTRAVENOUS ONCE
Refills: 0 | Status: COMPLETED | OUTPATIENT
Start: 2021-06-11 | End: 2021-06-11

## 2021-06-11 RX ORDER — ACETAMINOPHEN 500 MG
975 TABLET ORAL ONCE
Refills: 0 | Status: COMPLETED | OUTPATIENT
Start: 2021-06-11 | End: 2021-06-11

## 2021-06-11 RX ORDER — ACETAMINOPHEN 500 MG
2 TABLET ORAL
Qty: 84 | Refills: 0
Start: 2021-06-11 | End: 2021-06-24

## 2021-06-11 RX ORDER — ACETAMINOPHEN 500 MG
975 TABLET ORAL EVERY 8 HOURS
Refills: 0 | Status: DISCONTINUED | OUTPATIENT
Start: 2021-06-11 | End: 2021-06-13

## 2021-06-11 RX ORDER — VANCOMYCIN HCL 1 G
1500 VIAL (EA) INTRAVENOUS ONCE
Refills: 0 | Status: COMPLETED | OUTPATIENT
Start: 2021-06-11 | End: 2021-06-11

## 2021-06-11 RX ORDER — ESCITALOPRAM OXALATE 10 MG/1
10 TABLET, FILM COATED ORAL DAILY
Refills: 0 | Status: DISCONTINUED | OUTPATIENT
Start: 2021-06-11 | End: 2021-06-13

## 2021-06-11 RX ORDER — OXYCODONE HYDROCHLORIDE 5 MG/1
5 TABLET ORAL ONCE
Refills: 0 | Status: DISCONTINUED | OUTPATIENT
Start: 2021-06-11 | End: 2021-06-11

## 2021-06-11 RX ORDER — ACETAMINOPHEN 500 MG
1000 TABLET ORAL ONCE
Refills: 0 | Status: COMPLETED | OUTPATIENT
Start: 2021-06-11 | End: 2021-06-11

## 2021-06-11 RX ORDER — OXYCODONE HYDROCHLORIDE 5 MG/1
10 TABLET ORAL EVERY 4 HOURS
Refills: 0 | Status: DISCONTINUED | OUTPATIENT
Start: 2021-06-11 | End: 2021-06-13

## 2021-06-11 RX ORDER — ONDANSETRON 8 MG/1
8 TABLET, FILM COATED ORAL EVERY 8 HOURS
Refills: 0 | Status: DISCONTINUED | OUTPATIENT
Start: 2021-06-11 | End: 2021-06-13

## 2021-06-11 RX ORDER — FERROUS SULFATE 325(65) MG
325 TABLET ORAL DAILY
Refills: 0 | Status: DISCONTINUED | OUTPATIENT
Start: 2021-06-11 | End: 2021-06-13

## 2021-06-11 RX ORDER — SODIUM CHLORIDE 9 MG/ML
1000 INJECTION, SOLUTION INTRAVENOUS
Refills: 0 | Status: DISCONTINUED | OUTPATIENT
Start: 2021-06-11 | End: 2021-06-13

## 2021-06-11 RX ORDER — SENNA PLUS 8.6 MG/1
2 TABLET ORAL AT BEDTIME
Refills: 0 | Status: DISCONTINUED | OUTPATIENT
Start: 2021-06-11 | End: 2021-06-13

## 2021-06-11 RX ORDER — PANTOPRAZOLE SODIUM 20 MG/1
1 TABLET, DELAYED RELEASE ORAL
Qty: 30 | Refills: 0
Start: 2021-06-11 | End: 2021-07-10

## 2021-06-11 RX ORDER — DEXAMETHASONE 0.5 MG/5ML
8 ELIXIR ORAL ONCE
Refills: 0 | Status: COMPLETED | OUTPATIENT
Start: 2021-06-12 | End: 2021-06-12

## 2021-06-11 RX ORDER — ASCORBIC ACID 60 MG
500 TABLET,CHEWABLE ORAL
Refills: 0 | Status: DISCONTINUED | OUTPATIENT
Start: 2021-06-11 | End: 2021-06-13

## 2021-06-11 RX ORDER — URSODIOL 250 MG/1
500 TABLET, FILM COATED ORAL
Refills: 0 | Status: DISCONTINUED | OUTPATIENT
Start: 2021-06-11 | End: 2021-06-13

## 2021-06-11 RX ORDER — HYDROMORPHONE HYDROCHLORIDE 2 MG/ML
0.5 INJECTION INTRAMUSCULAR; INTRAVENOUS; SUBCUTANEOUS EVERY 4 HOURS
Refills: 0 | Status: DISCONTINUED | OUTPATIENT
Start: 2021-06-11 | End: 2021-06-13

## 2021-06-11 RX ORDER — RIVAROXABAN 15 MG-20MG
10 KIT ORAL
Refills: 0 | Status: DISCONTINUED | OUTPATIENT
Start: 2021-06-11 | End: 2021-06-13

## 2021-06-11 RX ORDER — FAMOTIDINE 10 MG/ML
20 INJECTION INTRAVENOUS ONCE
Refills: 0 | Status: DISCONTINUED | OUTPATIENT
Start: 2021-06-11 | End: 2021-06-11

## 2021-06-11 RX ORDER — ONDANSETRON 8 MG/1
4 TABLET, FILM COATED ORAL ONCE
Refills: 0 | Status: DISCONTINUED | OUTPATIENT
Start: 2021-06-11 | End: 2021-06-11

## 2021-06-11 RX ORDER — FENTANYL CITRATE 50 UG/ML
50 INJECTION INTRAVENOUS
Refills: 0 | Status: DISCONTINUED | OUTPATIENT
Start: 2021-06-11 | End: 2021-06-11

## 2021-06-11 RX ORDER — OXYCODONE HYDROCHLORIDE 5 MG/1
1 TABLET ORAL
Qty: 30 | Refills: 0
Start: 2021-06-11

## 2021-06-11 RX ORDER — OXYCODONE HYDROCHLORIDE 5 MG/1
5 TABLET ORAL EVERY 4 HOURS
Refills: 0 | Status: DISCONTINUED | OUTPATIENT
Start: 2021-06-11 | End: 2021-06-13

## 2021-06-11 RX ORDER — FOLIC ACID 0.8 MG
1 TABLET ORAL DAILY
Refills: 0 | Status: DISCONTINUED | OUTPATIENT
Start: 2021-06-11 | End: 2021-06-13

## 2021-06-11 RX ORDER — PANTOPRAZOLE SODIUM 20 MG/1
40 TABLET, DELAYED RELEASE ORAL ONCE
Refills: 0 | Status: COMPLETED | OUTPATIENT
Start: 2021-06-11 | End: 2021-06-11

## 2021-06-11 RX ORDER — SENNA PLUS 8.6 MG/1
1 TABLET ORAL
Qty: 7 | Refills: 0
Start: 2021-06-11 | End: 2021-06-17

## 2021-06-11 RX ORDER — PANTOPRAZOLE SODIUM 20 MG/1
40 TABLET, DELAYED RELEASE ORAL
Refills: 0 | Status: DISCONTINUED | OUTPATIENT
Start: 2021-06-11 | End: 2021-06-13

## 2021-06-11 RX ADMIN — Medication 975 MILLIGRAM(S): at 12:48

## 2021-06-11 RX ADMIN — OXYCODONE HYDROCHLORIDE 10 MILLIGRAM(S): 5 TABLET ORAL at 19:08

## 2021-06-11 RX ADMIN — Medication 500 MILLIGRAM(S): at 21:15

## 2021-06-11 RX ADMIN — SODIUM CHLORIDE 3 MILLILITER(S): 9 INJECTION INTRAMUSCULAR; INTRAVENOUS; SUBCUTANEOUS at 21:16

## 2021-06-11 RX ADMIN — SODIUM CHLORIDE 100 MILLILITER(S): 9 INJECTION, SOLUTION INTRAVENOUS at 10:18

## 2021-06-11 RX ADMIN — Medication 1000 MILLIGRAM(S): at 21:16

## 2021-06-11 RX ADMIN — Medication 400 MILLIGRAM(S): at 21:13

## 2021-06-11 RX ADMIN — Medication 1 TABLET(S): at 21:15

## 2021-06-11 RX ADMIN — OXYCODONE HYDROCHLORIDE 10 MILLIGRAM(S): 5 TABLET ORAL at 18:38

## 2021-06-11 RX ADMIN — PANTOPRAZOLE SODIUM 40 MILLIGRAM(S): 20 TABLET, DELAYED RELEASE ORAL at 06:59

## 2021-06-11 RX ADMIN — Medication 250 MILLIGRAM(S): at 18:34

## 2021-06-11 RX ADMIN — Medication 975 MILLIGRAM(S): at 07:00

## 2021-06-11 RX ADMIN — RIVAROXABAN 10 MILLIGRAM(S): KIT at 18:34

## 2021-06-11 RX ADMIN — Medication 975 MILLIGRAM(S): at 06:59

## 2021-06-11 RX ADMIN — ONDANSETRON 8 MILLIGRAM(S): 8 TABLET, FILM COATED ORAL at 21:32

## 2021-06-11 RX ADMIN — SENNA PLUS 2 TABLET(S): 8.6 TABLET ORAL at 21:16

## 2021-06-11 RX ADMIN — OXYCODONE HYDROCHLORIDE 5 MILLIGRAM(S): 5 TABLET ORAL at 11:04

## 2021-06-11 RX ADMIN — Medication 300 MILLIGRAM(S): at 07:00

## 2021-06-11 NOTE — CONSULT NOTE ADULT - SUBJECTIVE AND OBJECTIVE BOX
HPI:  Patient is a 81y old  Female who presents with a chief complaint of right hip pain s/p right total hip replacement 21.    Consulted by Dr. Hernandez for VTE prophylaxis, risk stratification, and anticoagulation management.    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 We discussed her history of "a genetic clotting" disorder. Dr. Conklin is heme. She has + prothrombin gene 93472 gene mutation aka Factor II gene mutation. She relays that her daughter had surgery and had post- op clots then discovered this genetic mutation. Patient denies any history of bleeding or clotting.     BMI: 38.3    CrCl:74.6    Incision Time:818  Procedure End Time:945  EBL:150ml    Caprini VTE Risk Score:  CAPRINI SCORE  AGE RELATED RISK FACTORS                                                       MOBILITY RELATED FACTORS  [ ] Age 41-60 years                                            (1 Point)                  [ ] Bed rest /restricted mobility                      (1 Point)  [ ] Age: 61-74 years                                           (2 Points)                [ ] Plaster cast                                                   (2 Points)  [x ] Age= 75 years                                              (3 Points)                 [ ] Bed bound for more than 72 hours                   (2 Points)    DISEASE RELATED RISK FACTORS                                               GENDER SPECIFIC FACTORS  [ ] Edema in the lower extremities                       (1 Point)           [ ] Pregnancy                                                            (1 Point)  [ ] Varicose veins                                               (1 Point)                  [ ] Post-partum < 6 weeks                                      (1 Point)             [ x] BMI > 25 Kg/m2                                            (1 Point)                  [ ] Hormonal therapy or oral contraception       (1 Point)                 [ ] Sepsis (in the previous month)                        (1 Point)             [ ] History of pregnancy complications                (1Point)  [ ] Pneumonia or serious lung disease                                             [ ] Unexplained or recurrent  (=/>3), premature                                 (In the previous month)                               (1 Point)                birth with toxemia or growth-restricted infant (1 Point)  [ ] Abnormal pulmonary function test            (1 Point)                                   SURGERY RELATED RISK FACTORS  [ ] Acute myocardial infarction                       (1 Point)                  [ ]  Section                                         (1 Point)  [ ] Congestive heart failure (in the previous month) (1 Point)   [ ] Minor surgery   lasting <45 minutes       (1 Point)   [ ] Inflammatory bowel disease                             (1 Point)          [ ] Arthroscopic surgery                                  (2 Points)  [ ] Central venous access                                    (2 Points)            [ ] General surgery lasting >45 minutes      (2 Points)       [ ] Stroke (in the previous month)                  (5 Points)            [x ] Elective major lower extremity arthroplasty (5 Points)                                   [  ] Malignancy (present or past include skin melanoma                                          but exclude  basal skin cell)    (2 points)                                      TRAUMA RELATED RISK FACTORS                HEMATOLOGY RELATED FACTORS                                  [ ] Fracture of the hip, pelvis, or leg                       (5 Points)  [ ] Prior episodes of VTE                                     (3 Points)          [ ] Acute spinal cord injury (in the previous month)  (5 Points)  [ ] Positive family history for VTE                         (3 Points)       [ ] Paralysis (less than 1 month)                          (5 Points)  [ x] Prothrombin 29839 A                                      (3 Points)         [ ] Multiple Trauma (within 1month)                 (5Points)                                                                                                                                                                [ ] Factor V Leiden                                          (3 Points)                                OTHER RISK FACTORS                          [ ] Lupus anticoagulants                                     (3 Points)                     [ ] BMI > 40                          (1 Point)                                                         [ ] Anticardiolipin antibodies                                (3 Points)                 [ ] Smoking                              (1Point)                                                [ ] High homocysteine in the blood                      (3 Points)                [  ] Diabetes requiring insulin (1point)                         [ ] Other congenital or acquired thrombophilia       (3 Points)          [  ] Chemotherapy                   (1 Point)  [ ] Heparin induced thrombocytopenia                  (3 Points)             [  ] Blood Transfusion                (1 point)                                                                                                             Total Score [     12     ]                                                                                                                                                                                                                                                                                                                                                                                                                                        IMPROVE Bleeding Risk Score:1.5      Falls Risk:   High ( x )  Mod (  )  Low (  )      FAMILY HISTORY:  FH: prothrombin gene mutation- daughter    Allergies    penicillamine (Rash (Mod to Severe))  sulfa drugs (Unknown)    Intolerances        REVIEW OF SYSTEMS    (  )Fever	        (  )Constipation	(  )SOB				  (  )Headache   (  )Dysuria  (  )Chills	        (  )Melena	        (  )Dyspnea on exertion (  )Dizziness    (  )Polyuria  (  )Nausea      (  )Hematochezia	(  )Cough                          (  )Syncope      (  )Hematuria  (  )Vomiting   (  )Chest Pain	        (  )Wheezing			  (  )Weakness  (  )Diarrhea    (  )Palpitations	(  )Anorexia			  (  x)Myalgia       ( x  ) Arthralgia    Pertinent positives in HPI and daily subjective. All other systems negative.    Vital Signs Last 24 Hrs  T(C): 36.3 (21 @ 11:26), Max: 36.4 (21 @ 06:33)  T(F): 97.4 (21 @ 11:26), Max: 97.6 (21 @ 06:33)  HR: 75 (21 @ 11:26) (69 - 95)  BP: 116/47 (21 @ 11:26) (84/39 - 131/70)  BP(mean): --  RR: 16 (21 @ 11:26) (12 - 18)  SpO2: 100% (21 @ 11:26) (93% - 100%)      PHYSICAL EXAM:    Constitutional: Appears Well    Neurological: A& O x 3    Skin: Warm    Respiratory and Thorax: normal effort; Breath sounds: normal; No rales/wheezing/rhonchi  	  Cardiovascular: S1, S2, regular, NMBR	    Gastrointestinal: BS + x 4Q, nontender	    Genitourinary:  Bladder nondistended, nontender    Musculoskeletal:   General Right:   + muscle/joint tenderness,   normal tone, no joint swelling,   ROM: limited	    General Left:   no muscle/joint tenderness,   normal tone, no joint swelling,   ROM: full    Hip: Right: mepilex dressing CDI      Lower extrems:   Right: no calf tenderness              negative fiorella's sign               + pedal pulses    Left:   no calf tenderness              negative fiorella's sign               + pedal pulses                          9.9    7.67  )-----------( 161      ( 2021 10:14 )             32.6       06-11    141  |  108  |  27<H>  ----------------------------<  86  4.6   |  29  |  1.02    Ca    8.3<L>      2021 10:14        MEDICATIONS  (STANDING):  acetaminophen   Tablet .. 975 milliGRAM(s) Oral every 8 hours  ascorbic acid 500 milliGRAM(s) Oral two times a day  calcium carbonate 1250 mG  + Vitamin D (OsCal 500 + D) 1 Tablet(s) Oral three times a day  escitalopram 10 milliGRAM(s) Oral daily  famotidine    Tablet 20 milliGRAM(s) Oral once  ferrous    sulfate 325 milliGRAM(s) Oral daily  folic acid 1 milliGRAM(s) Oral daily  lactated ringers. 1000 milliLiter(s) IV Continuous <Continuous>  multivitamin 1 Tablet(s) Oral daily  pantoprazole    Tablet 40 milliGRAM(s) Oral before breakfast  polyethylene glycol 3350 17 Gram(s) Oral at bedtime  rivaroxaban 10 milliGRAM(s) Oral <User Schedule>  senna 2 Tablet(s) Oral at bedtime  sodium chloride 0.9% lock flush 3 milliLiter(s) IV Push every 8 hours  ursodiol Tablet 500 milliGRAM(s) Oral two times a day  vancomycin  IVPB 1000 milliGRAM(s) IV Intermittent once      **Current DVT Prophylaxis:    LMWH                   (  )  Heparin SQ           (  )  Coumadin             (  )  Xarelto                  (x  )  Eliquis                   (  )  Venodynes           ( x )  Ambulation          ( x )  UFH                       (  )  ECASA                   (  )  Contraindicated  (  )

## 2021-06-11 NOTE — CONSULT NOTE ADULT - SUBJECTIVE AND OBJECTIVE BOX
PCP: Dr KASIA Neumann    CHIEF COMPLAINT: right hip OA, S/P right THR    HISTORY OF THE PRESENT ILLNESS: this is a 80 yo female with PMH of  depression, CAD, HLD, ITP, factor 2 (Prothrombin gene) def, heterozygous, biliary cirrhosis, intermittent claudication, COPD, GERD,  and OA of the right hip with progressive pain with ambulation > 1 year with climbing stairs and INC pain at night, who failed the usual modalities for hip pain and is now s/p right THR.  Pt is seen in PACU, IN NAD, denies any CP or SOB.  We are consulted for medical management    PAST MEDICAL HISTORY: as above    PAST SURGICAL HISTORY: julianna, loop recorder implanted in 2016, card cath, liver bx    FAMILY HISTORY:   Mother dec CAD, unknown age, Father dec, age unknown, Lung CA, DTR with same clotting disorder    SOCIAL HISTORY:  former smoker 40 pk years, quit in 1984, social alcohol, no drugs    ALLERGIES: sulf and PCNs--->rash    [facility] MEDS:see [facility]    REVIEW OF SYSTEMS:   All 10 systems reviewed in detailed and found to be negative with the exception of what has already been described above    MEDICATIONS  (STANDING):  lactated ringers. 1000 milliLiter(s) (100 mL/Hr) IV Continuous <Continuous>    MEDICATIONS  (PRN):  fentaNYL    Injectable 50 MICROGram(s) IV Push every 10 minutes PRN Severe Pain (7 - 10)  ondansetron Injectable 4 milliGRAM(s) IV Push once PRN Nausea and/or Vomiting      VITALS:  T(F): 97.3 (06-11-21 @ 11:00), Max: 97.6 (06-11-21 @ 06:33)  HR: 70 (06-11-21 @ 11:00) (69 - 95)  BP: 100/44 (06-11-21 @ 11:00) (84/39 - 131/70)  RR: 16 (06-11-21 @ 11:00) (12 - 18)  SpO2: 99% (06-11-21 @ 11:00) (93% - 99%)  Wt(kg): --    I&O's Summary    11 Jun 2021 07:01  -  11 Jun 2021 11:15  --------------------------------------------------------  IN: 1130 mL / OUT: 0 mL / NET: 1130 mL        CAPILLARY BLOOD GLUCOSE      POCT Blood Glucose.: 97 mg/dL (11 Jun 2021 09:46)  POCT Blood Glucose.: 111 mg/dL (11 Jun 2021 06:47)    PHYSICAL EXAM:    GENERAL: Comfortable, no acute distress   HEAD:  Normocephalic, atraumatic  EYES: EOMI, PERRLA  HEENT: Moist mucous membranes  NECK: Supple, No JVD  NERVOUS SYSTEM:  Alert & Oriented X3, Motor Strength 5/5 B/L upper and lower extremities  CHEST/LUNG: Clear to auscultation bilaterally  HEART: Regular rate and rhythm  ABDOMEN: Soft, non tender, Nondistended, Bowel sounds present  GENITOURINARY: Voiding, no palpable bladder  EXTREMITIES:   No clubbing, cyanosis, or edema  MUSCULOSKELETAL- right hip mepilax dsg c/d/i  SKIN-no rash        LABS:                            9.9    7.67  )-----------( 161      ( 11 Jun 2021 10:14 )             32.6     06-11    141  |  108  |  27<H>  ----------------------------<  86  4.6   |  29  |  1.02    Ca    8.3<L>      11 Jun 2021 10:14              IMPRESSION: 80 yo female with above pmh a/w:  # right hip osteoarthritis  # S/P right THR    POD#0  pain control  PT eval  Bowel regimen  IVF's  Finish ABXs  regular diet  PPI  VTE prophylaxis  monitor CBC/BMP      #acute blood loss anemia   D/T surgery   no need for transfusions presently    Monitor HH    # H/O ITP    was on prednisone x 15 yr    PLTS 161    monitor    #depression    cont home med      # HLD  cont statin    # Vte prophylaxis/factor 2 def  AC consult  Managed Methods  INC mobility      Thank you for the consult, will follow

## 2021-06-11 NOTE — PHYSICAL THERAPY INITIAL EVALUATION ADULT - GENERAL OBSERVATIONS, REHAB EVAL
The pt was received on 2N, supine, pleasant and cooperative, eager to get OOB and go to the bathroom with PT. The pt had 1 IV and 1 Abduction Pillow and bilateral SCDs.

## 2021-06-11 NOTE — PHYSICAL THERAPY INITIAL EVALUATION ADULT - MODALITIES TREATMENT COMMENTS
The pt demonstrated good overall activity tolerance, responding well to therex review, transfer and ambulation tx. The pt was returned to supine and adjusted for comfort in bed, abduction pillow in place, bed alarm secured, RN aware. CB, tray and phone in place. The pt was in NAD at end of tx.

## 2021-06-11 NOTE — DISCHARGE NOTE PROVIDER - CARE PROVIDER_API CALL
Esteban Hernandez)  Orthopaedic Surgery  [address] [place] [address] [place] [address]  Phone: [phone]  Fax: [phone]  Follow Up Time:

## 2021-06-11 NOTE — CONSULT NOTE ADULT - ASSESSMENT
This is a 81 year old female s/p right total hip replacement 6/11 with high risk for VTE due to history prothrombin gene [id number] mutation, BMI, age, impaired mobility, and surgery. She is low risk for bleeding.    Discussed Xarelto and the risks and benefits with patient. Emphasized the importance of taking medication daily to prevent VTE. Verbalized understanding and is in agreement with treatment plan.    Plan:  ::Xarelto 10 mg PO daily x 35 days total starting 6/11/21 with last dose---->7/15/21  ::PPI- Protonix 40 mg daily  ::Daily CBC/BMP  ::Enc. ambulation  ::Dayo    Thank you for this consult, will continue to follow.  Dispo: Home versus rehab

## 2021-06-11 NOTE — PHYSICAL THERAPY INITIAL EVALUATION ADULT - PERTINENT HX OF CURRENT PROBLEM, REHAB EVAL
80 yo female with PMH of  depression, CAD, HLD, ITP, factor 2 (Prothrombin gene) def, heterozygous, biliary cirrhosis, intermittent claudication, COPD, GERD,  and OA of the right hip with progressive pain with ambulation > 1 year with climbing stairs and INC pain at night, who failed the usual modalities for hip pain and is now s/p right THR.

## 2021-06-11 NOTE — DISCHARGE NOTE PROVIDER - NSDCFUADDINST_GEN_ALL_CORE_FT
Discharge Instructions for Total Hip Arthroplasty:    1. ACTIVITY: WBAT. Rolling walker. Posterior Hip Dislocation Precautions. Abduction Pillow while in bed for 6 weeks. Daily PT.  2. CALL FOR: fever over 101, wound redness, drainage or open area, calf pain/calf swelling.  3. BANDAGE: Change dressing to a new Mepilex Ag bandage POD7 (6/18). May change sooner if dressing saturated or falling off. DO NOT REMOVE BANDAGE TO CHECK WOUND ON INTAKE.  4. STAPLES: RN Remove Staples POD14 (6/25).  5. SHOWER: Okay to shower. Do not scrub dressing or submerge under water. No baths or hot tubs.   6. DVT PE Prophylaxis: Managed by Anticoag Team.  See Med Rec.  7.  GI: Continue Protonix daily while on Anticoagulant. eRx has been sent to your pharmacy.  8. LABS: INR only if on Coumadin.  9.  FOLLOW UP: Dr. Hernandez in 1 month. Call to schedule.  10. MEDICATION: eRX sent to your pharmacy for  if you go home.   11.**Call office if medications not covered under your insurance, especially BLOOD CLOT PREVENTION/anticoagulant medication.

## 2021-06-11 NOTE — PHYSICAL THERAPY INITIAL EVALUATION ADULT - PATIENT/FAMILY/SIGNIFICANT OTHER GOALS STATEMENT, PT EVAL
The pt hopes to go home once medically stable for discharge but states that she lives alone, she is open to going to [facility] if need be but prefers Home, the pt reports that she has 14 steps at home to her bedroom/ bathroom with unilateral rails.

## 2021-06-11 NOTE — DISCHARGE NOTE PROVIDER - NSDCMRMEDTOKEN_GEN_ALL_CORE_FT
escitalopram 10 mg oral tablet: 1 tab(s) orally once a day  Eye Multivitamin oral tablet: 1 tab(s) orally once a day  Metamucil 1.7 g oral wafer:   MiraLax oral powder for reconstitution: 17 gram(s) orally once a day, As Needed   oxyCODONE 5 mg oral tablet: 1 tab(s) orally every 6 hours MDD:6 tablets  Protonix 40 mg oral delayed release tablet: 1 tab(s) orally once a day   senna oral tablet: 1 tab(s) orally once a day, As Needed -for constipation   Tylenol Extra Strength 500 mg oral tablet: 2 tab(s) orally 3 times a day   ursodiol 500 mg oral tablet: orally 2 times a day  Vitamin D3 1000 intl units oral tablet: 1 tab(s) orally once a day

## 2021-06-11 NOTE — DISCHARGE NOTE PROVIDER - HOSPITAL COURSE
H&P:  Pt is a 81y Female    PAST MEDICAL & SURGICAL HISTORY:  Idiopathic thrombocytopenic purpura    Acid reflux    Emphysema    Primary biliary cholangitis    Anxiety disorder    Pure hypercholesterolemia    Lymph nodes enlarged  in chest    Osteoarthritis    Mild emphysema    Spinal stenosis    Pulmonary nodule    S/P cholecystectomy  2011    Status post placement of implantable loop recorder  2014  removed Jan 2018    H/O coronary angiogram  2012    Status post cataract surgery  b/l    S/P colonoscopy    Now s/p Right Total Hip Arthroplasty. Pt is afebrile with stable vital signs. Pain is controlled. Alert and Oriented. Exam reveals intact EHL FHL TA GS, +DP. Dressing is clean and dry.    Hospital Course:  Patient presented to [facility] medically cleared for elective Right Total Hip Replacement Surgery, having failed outpatient conservative management. Prophylactic antibiotics were started before the procedure and continued for 24 hours. They were admitted after surgery to the orthopedic floor. There were no complications during the hospital stay. All home medications were continued.     **Pt received **U PRBC post op for Acute Blood Loss Anemia.    Routine consults were obtained from the Anticoagulation Team for DVT/PE prophylaxis, from Physical Therapy for twice daily PT, and from the Hospitalist for Medical Co-management. Patient was placed on anticoagulation. Pertinent home medications were continued. Daily labs were followed.      POD 0 pt was stable overnight. No Major issues POD1-2. Pt received PT twice daily. The plan is for DC to home with home PT** or to Rehab for ongoing PT**. The orthopedic Attending is aware and agrees. H&P:  Pt is a 81y Female    PAST MEDICAL & SURGICAL HISTORY:  Idiopathic thrombocytopenic purpura    Acid reflux    Emphysema    Primary biliary cholangitis    Anxiety disorder    Pure hypercholesterolemia    Lymph nodes enlarged  in chest    Osteoarthritis    Mild emphysema    Spinal stenosis    Pulmonary nodule    S/P cholecystectomy  2011    Status post placement of implantable loop recorder  2014  removed Jan 2018    H/O coronary angiogram  2012    Status post cataract surgery  b/l    S/P colonoscopy    Now s/p Right Total Hip Arthroplasty. Pt is afebrile with stable vital signs. Pain is controlled. Alert and Oriented. Exam reveals intact EHL FHL TA GS, +DP. Dressing is clean and dry.    Hospital Course:  Patient presented to Health system medically cleared for elective Right Total Hip Replacement Surgery, having failed outpatient conservative management. Prophylactic antibiotics were started before the procedure and continued for 24 hours. They were admitted after surgery to the orthopedic floor. There were no complications during the hospital stay. All home medications were continued.     Pt received 1U PRBC intraop for Acute Blood Loss Anemia.    Routine consults were obtained from the Anticoagulation Team for DVT/PE prophylaxis, from Physical Therapy for twice daily PT, and from the Hospitalist for Medical Co-management. Patient was placed on anticoagulation. Pertinent home medications were continued. Daily labs were followed.      POD 0 pt was stable overnight. No Major issues POD. Pt received PT twice daily. The plan is for DC to home with home PT. The orthopedic Attending is aware and agrees.

## 2021-06-12 LAB
ANION GAP SERPL CALC-SCNC: 6 MMOL/L — SIGNIFICANT CHANGE UP (ref 5–17)
BUN SERPL-MCNC: 31 MG/DL — HIGH (ref 7–23)
CALCIUM SERPL-MCNC: 8.5 MG/DL — SIGNIFICANT CHANGE UP (ref 8.5–10.1)
CHLORIDE SERPL-SCNC: 104 MMOL/L — SIGNIFICANT CHANGE UP (ref 96–108)
CO2 SERPL-SCNC: 26 MMOL/L — SIGNIFICANT CHANGE UP (ref 22–31)
COVID-19 SPIKE DOMAIN AB INTERP: POSITIVE
COVID-19 SPIKE DOMAIN ANTIBODY RESULT: 65.9 U/ML — HIGH
CREAT SERPL-MCNC: 1.2 MG/DL — SIGNIFICANT CHANGE UP (ref 0.5–1.3)
GLUCOSE SERPL-MCNC: 137 MG/DL — HIGH (ref 70–99)
HCT VFR BLD CALC: 31.7 % — LOW (ref 34.5–45)
HGB BLD-MCNC: 9.8 G/DL — LOW (ref 11.5–15.5)
MCHC RBC-ENTMCNC: 25.5 PG — LOW (ref 27–34)
MCHC RBC-ENTMCNC: 30.9 GM/DL — LOW (ref 32–36)
MCV RBC AUTO: 82.3 FL — SIGNIFICANT CHANGE UP (ref 80–100)
PLATELET # BLD AUTO: 173 K/UL — SIGNIFICANT CHANGE UP (ref 150–400)
POTASSIUM SERPL-MCNC: 4.5 MMOL/L — SIGNIFICANT CHANGE UP (ref 3.5–5.3)
POTASSIUM SERPL-SCNC: 4.5 MMOL/L — SIGNIFICANT CHANGE UP (ref 3.5–5.3)
RBC # BLD: 3.85 M/UL — SIGNIFICANT CHANGE UP (ref 3.8–5.2)
RBC # FLD: 15.9 % — HIGH (ref 10.3–14.5)
SARS-COV-2 IGG+IGM SERPL QL IA: 65.9 U/ML — HIGH
SARS-COV-2 IGG+IGM SERPL QL IA: POSITIVE
SODIUM SERPL-SCNC: 136 MMOL/L — SIGNIFICANT CHANGE UP (ref 135–145)
WBC # BLD: 16.26 K/UL — HIGH (ref 3.8–10.5)
WBC # FLD AUTO: 16.26 K/UL — HIGH (ref 3.8–10.5)

## 2021-06-12 PROCEDURE — 99231 SBSQ HOSP IP/OBS SF/LOW 25: CPT

## 2021-06-12 PROCEDURE — 99232 SBSQ HOSP IP/OBS MODERATE 35: CPT

## 2021-06-12 RX ORDER — ACETAMINOPHEN 500 MG
1000 TABLET ORAL ONCE
Refills: 0 | Status: COMPLETED | OUTPATIENT
Start: 2021-06-12 | End: 2021-06-12

## 2021-06-12 RX ADMIN — Medication 1 TABLET(S): at 13:53

## 2021-06-12 RX ADMIN — SODIUM CHLORIDE 3 MILLILITER(S): 9 INJECTION INTRAMUSCULAR; INTRAVENOUS; SUBCUTANEOUS at 13:53

## 2021-06-12 RX ADMIN — RIVAROXABAN 10 MILLIGRAM(S): KIT at 17:42

## 2021-06-12 RX ADMIN — Medication 325 MILLIGRAM(S): at 10:44

## 2021-06-12 RX ADMIN — Medication 1 TABLET(S): at 05:29

## 2021-06-12 RX ADMIN — Medication 500 MILLIGRAM(S): at 10:44

## 2021-06-12 RX ADMIN — Medication 1 TABLET(S): at 21:50

## 2021-06-12 RX ADMIN — ESCITALOPRAM OXALATE 10 MILLIGRAM(S): 10 TABLET, FILM COATED ORAL at 10:44

## 2021-06-12 RX ADMIN — Medication 1000 MILLIGRAM(S): at 05:30

## 2021-06-12 RX ADMIN — SENNA PLUS 2 TABLET(S): 8.6 TABLET ORAL at 21:50

## 2021-06-12 RX ADMIN — Medication 500 MILLIGRAM(S): at 21:50

## 2021-06-12 RX ADMIN — SODIUM CHLORIDE 3 MILLILITER(S): 9 INJECTION INTRAMUSCULAR; INTRAVENOUS; SUBCUTANEOUS at 05:30

## 2021-06-12 RX ADMIN — PANTOPRAZOLE SODIUM 40 MILLIGRAM(S): 20 TABLET, DELAYED RELEASE ORAL at 05:29

## 2021-06-12 RX ADMIN — Medication 1 TABLET(S): at 10:44

## 2021-06-12 RX ADMIN — Medication 975 MILLIGRAM(S): at 13:53

## 2021-06-12 RX ADMIN — SODIUM CHLORIDE 3 MILLILITER(S): 9 INJECTION INTRAMUSCULAR; INTRAVENOUS; SUBCUTANEOUS at 21:51

## 2021-06-12 RX ADMIN — Medication 101.6 MILLIGRAM(S): at 06:12

## 2021-06-12 RX ADMIN — Medication 975 MILLIGRAM(S): at 21:50

## 2021-06-12 RX ADMIN — Medication 975 MILLIGRAM(S): at 21:51

## 2021-06-12 RX ADMIN — Medication 1 MILLIGRAM(S): at 10:44

## 2021-06-12 RX ADMIN — Medication 400 MILLIGRAM(S): at 05:29

## 2021-06-12 RX ADMIN — URSODIOL 500 MILLIGRAM(S): 250 TABLET, FILM COATED ORAL at 21:51

## 2021-06-13 ENCOUNTER — TRANSCRIPTION ENCOUNTER (OUTPATIENT)
Age: 81
End: 2021-06-13

## 2021-06-13 VITALS
TEMPERATURE: 98 F | OXYGEN SATURATION: 97 % | RESPIRATION RATE: 16 BRPM | DIASTOLIC BLOOD PRESSURE: 51 MMHG | SYSTOLIC BLOOD PRESSURE: 130 MMHG | HEART RATE: 87 BPM

## 2021-06-13 LAB
ANION GAP SERPL CALC-SCNC: 1 MMOL/L — LOW (ref 5–17)
BUN SERPL-MCNC: 32 MG/DL — HIGH (ref 7–23)
CALCIUM SERPL-MCNC: 8.7 MG/DL — SIGNIFICANT CHANGE UP (ref 8.5–10.1)
CHLORIDE SERPL-SCNC: 105 MMOL/L — SIGNIFICANT CHANGE UP (ref 96–108)
CO2 SERPL-SCNC: 30 MMOL/L — SIGNIFICANT CHANGE UP (ref 22–31)
CREAT SERPL-MCNC: 1.17 MG/DL — SIGNIFICANT CHANGE UP (ref 0.5–1.3)
GLUCOSE SERPL-MCNC: 117 MG/DL — HIGH (ref 70–99)
HCT VFR BLD CALC: 30.8 % — LOW (ref 34.5–45)
HGB BLD-MCNC: 9.6 G/DL — LOW (ref 11.5–15.5)
MCHC RBC-ENTMCNC: 25.7 PG — LOW (ref 27–34)
MCHC RBC-ENTMCNC: 31.2 GM/DL — LOW (ref 32–36)
MCV RBC AUTO: 82.4 FL — SIGNIFICANT CHANGE UP (ref 80–100)
PLATELET # BLD AUTO: 138 K/UL — LOW (ref 150–400)
POTASSIUM SERPL-MCNC: 4.9 MMOL/L — SIGNIFICANT CHANGE UP (ref 3.5–5.3)
POTASSIUM SERPL-SCNC: 4.9 MMOL/L — SIGNIFICANT CHANGE UP (ref 3.5–5.3)
RBC # BLD: 3.74 M/UL — LOW (ref 3.8–5.2)
RBC # FLD: 15.9 % — HIGH (ref 10.3–14.5)
SODIUM SERPL-SCNC: 136 MMOL/L — SIGNIFICANT CHANGE UP (ref 135–145)
WBC # BLD: 12.57 K/UL — HIGH (ref 3.8–10.5)
WBC # FLD AUTO: 12.57 K/UL — HIGH (ref 3.8–10.5)

## 2021-06-13 PROCEDURE — 99231 SBSQ HOSP IP/OBS SF/LOW 25: CPT

## 2021-06-13 PROCEDURE — 99232 SBSQ HOSP IP/OBS MODERATE 35: CPT

## 2021-06-13 RX ORDER — RIVAROXABAN 15 MG-20MG
1 KIT ORAL
Qty: 33 | Refills: 0
Start: 2021-06-13 | End: 2021-07-15

## 2021-06-13 RX ADMIN — Medication 975 MILLIGRAM(S): at 06:11

## 2021-06-13 RX ADMIN — Medication 1 TABLET(S): at 06:10

## 2021-06-13 RX ADMIN — Medication 500 MILLIGRAM(S): at 09:46

## 2021-06-13 RX ADMIN — Medication 1 TABLET(S): at 09:46

## 2021-06-13 RX ADMIN — ESCITALOPRAM OXALATE 10 MILLIGRAM(S): 10 TABLET, FILM COATED ORAL at 09:46

## 2021-06-13 RX ADMIN — URSODIOL 500 MILLIGRAM(S): 250 TABLET, FILM COATED ORAL at 09:45

## 2021-06-13 RX ADMIN — SODIUM CHLORIDE 3 MILLILITER(S): 9 INJECTION INTRAMUSCULAR; INTRAVENOUS; SUBCUTANEOUS at 06:11

## 2021-06-13 RX ADMIN — PANTOPRAZOLE SODIUM 40 MILLIGRAM(S): 20 TABLET, DELAYED RELEASE ORAL at 06:10

## 2021-06-13 RX ADMIN — Medication 1 MILLIGRAM(S): at 09:46

## 2021-06-13 RX ADMIN — Medication 325 MILLIGRAM(S): at 09:46

## 2021-06-13 NOTE — PROGRESS NOTE ADULT - SUBJECTIVE AND OBJECTIVE BOX
PCP: Dr KASIA Neumann    CHIEF COMPLAINT: right hip OA, S/P right THR    HISTORY OF THE PRESENT ILLNESS: this is a 82 yo female with PMH of  depression, CAD, HLD, ITP, factor 2 (Prothrombin gene) def, heterozygous, biliary cirrhosis, intermittent claudication, COPD, GERD,  and OA of the right hip with progressive pain with ambulation > 1 year with climbing stairs and INC pain at night, who failed the usual modalities for hip pain and is now s/p right THR.  Pt is seen in PACU, IN NAD, denies any CP or SOB.  We are consulted for medical management    PAST MEDICAL HISTORY: as above  PAST SURGICAL HISTORY: julianna, loop recorder implanted in 2016, card cath, liver bx  FAMILY HISTORY:   Mother dec CAD, unknown age, Father dec, age unknown, Lung CA, DTR with same clotting disorder  SOCIAL HISTORY:  former smoker 40 pk years, quit in 1984, social alcohol, no drugs  ALLERGIES: sulf and PCNs--->rash  HOME MEDS:see [facility]    6/12/21- up and ambulated with PT, now in chair. Has some postop pain.   6/13/21 ambulating with walker, going home later on today    REVIEW OF SYSTEMS:   All 10 systems reviewed in detailed and found to be negative with the exception of what has already been described above    Vital Signs Last 24 Hrs  T(C): 36.8 (13 Jun 2021 09:18), Max: 36.8 (13 Jun 2021 09:18)  T(F): 98.2 (13 Jun 2021 09:18), Max: 98.2 (13 Jun 2021 09:18)  HR: 87 (13 Jun 2021 09:18) (86 - 88)  BP: 130/51 (13 Jun 2021 09:18) (130/51 - 165/58)  BP(mean): 72 (13 Jun 2021 09:18) (72 - 72)  RR: 16 (13 Jun 2021 09:18) (16 - 16)  SpO2: 97% (13 Jun 2021 09:18) (94% - 97%)    PHYSICAL EXAM:  GENERAL: Comfortable, no acute distress   HEAD:  Normocephalic, atraumatic  EYES: EOMI, PERRLA  HEENT: Moist mucous membranes  NECK: Supple, No JVD  NERVOUS SYSTEM:  Alert & Oriented X3, Motor Strength 5/5 B/L upper and lower extremities  CHEST/LUNG: Clear to auscultation bilaterally  HEART: Regular rate and rhythm  ABDOMEN: Soft, non tender, Nondistended, Bowel sounds present  GENITOURINARY: Voiding, no palpable bladder  EXTREMITIES:   No clubbing, cyanosis, or edema  MUSCULOSKELETAL- right hip mepilax dsg c/d/i  SKIN-no rash    LABS:                                 9.6    12.57 )-----------( 138      ( 13 Jun 2021 06:51 )             30.8     13 Jun 2021 06:51    136    |  105    |  32     ----------------------------<  117    4.9     |  30     |  1.17     Ca    8.7        13 Jun 2021 06:51    CAPILLARY BLOOD GLUCOSE  POCT Blood Glucose.: 157 mg/dL (12 Jun 2021 21:53)    MEDICATIONS  (STANDING):  acetaminophen   Tablet .. 975 milliGRAM(s) Oral every 8 hours  ascorbic acid 500 milliGRAM(s) Oral two times a day  calcium carbonate 1250 mG  + Vitamin D (OsCal 500 + D) 1 Tablet(s) Oral three times a day  escitalopram 10 milliGRAM(s) Oral daily  ferrous    sulfate 325 milliGRAM(s) Oral daily  folic acid 1 milliGRAM(s) Oral daily  lactated ringers. 1000 milliLiter(s) (75 mL/Hr) IV Continuous <Continuous>  multivitamin 1 Tablet(s) Oral daily  pantoprazole    Tablet 40 milliGRAM(s) Oral before breakfast  polyethylene glycol 3350 17 Gram(s) Oral at bedtime  rivaroxaban 10 milliGRAM(s) Oral <User Schedule>  senna 2 Tablet(s) Oral at bedtime  sodium chloride 0.9% lock flush 3 milliLiter(s) IV Push every 8 hours  ursodiol Tablet 500 milliGRAM(s) Oral two times a day    MEDICATIONS  (PRN):  bisacodyl Suppository 10 milliGRAM(s) Rectal once PRN Constipation  HYDROmorphone  Injectable 0.5 milliGRAM(s) SubCutaneous every 4 hours PRN Severe Pain (7 - 10)  ondansetron Injectable 8 milliGRAM(s) IV Push every 8 hours PRN Nausea and/or Vomiting  oxyCODONE    IR 5 milliGRAM(s) Oral every 4 hours PRN Mild Pain (1 - 3)  oxyCODONE    IR 10 milliGRAM(s) Oral every 4 hours PRN Moderate Pain (4 - 6)    IMPRESSION: 82 yo female with above pmh a/w:  # right hip osteoarthritis  # S/P right THR  Ortho f/u appreciated  PT as tolerated  Pain meds prn  HH stable for discharge  AC by xarelto  Bowel regimen  Incentive spirometry    #acute blood loss anemia  D/T surgery  no need for transfusions presently  Monitor HH    # H/O ITP    was on prednisone x 15 yr    PLTS 161    monitor    #depression    cont home med    # HLD  cont statin    # Vte prophylaxis/factor 2 def  AC consult  [employer] mobility    #Dispo- continue to mobilize with PT, home with home PT today. D/w pt      
Patient seen and examined at bedside. Reports no acute complaints at this time. Pain is well controlled. No acute events overnight.    PHYSICAL EXAM:  Vital Signs Last 24 Hrs  T(C): 36.7 (12 Jun 2021 05:06), Max: 36.9 (11 Jun 2021 20:20)  T(F): 98.1 (12 Jun 2021 05:06), Max: 98.5 (11 Jun 2021 20:20)  HR: 107 (12 Jun 2021 05:06) (69 - 107)  BP: 109/55 (12 Jun 2021 05:06) (84/39 - 144/75)  BP(mean): --  RR: 16 (12 Jun 2021 05:06) (12 - 18)  SpO2: 92% (12 Jun 2021 05:06) (92% - 100%)      Gen: NAD, AAOx3    Right Lower Extremity:  Abduction pillow in place  Dressing clean dry intact  +EHL/FHL/TA/GS  SILT L3-S1  +DP/PT Pulses  Compartments soft  No calf TTP B/L 
  PCP: Dr KASIA Neumann    CHIEF COMPLAINT: right hip OA, S/P right THR    HISTORY OF THE PRESENT ILLNESS: this is a 82 yo female with PMH of  depression, CAD, HLD, ITP, factor 2 (Prothrombin gene) def, heterozygous, biliary cirrhosis, intermittent claudication, COPD, GERD,  and OA of the right hip with progressive pain with ambulation > 1 year with climbing stairs and INC pain at night, who failed the usual modalities for hip pain and is now s/p right THR.  Pt is seen in PACU, IN NAD, denies any CP or SOB.  We are consulted for medical management    PAST MEDICAL HISTORY: as above  PAST SURGICAL HISTORY: julianna, loop recorder implanted in 2016, card cath, liver bx  FAMILY HISTORY:   Mother dec CAD, unknown age, Father dec, age unknown, Lung CA, DTR with same clotting disorder  SOCIAL HISTORY:  former smoker 40 pk years, quit in 1984, social alcohol, no drugs  ALLERGIES: sulf and PCNs--->rash  [facility] MEDS:see [facility]    6/12/21- up and ambulated with PT, now in chair. Has some postop pain.     REVIEW OF SYSTEMS:   All 10 systems reviewed in detailed and found to be negative with the exception of what has already been described above    Vital Signs Last 24 Hrs  T(C): 37.4 (12 Jun 2021 09:26), Max: 37.4 (12 Jun 2021 09:26)  T(F): 99.4 (12 Jun 2021 09:26), Max: 99.4 (12 Jun 2021 09:26)  HR: 91 (12 Jun 2021 09:26) (91 - 107)  BP: 111/46 (12 Jun 2021 09:26) (109/55 - 144/75)  BP(mean): 61 (12 Jun 2021 09:26) (61 - 61)  RR: 16 (12 Jun 2021 09:26) (16 - 16)  SpO2: 98% (12 Jun 2021 09:26) (92% - 98%)    PHYSICAL EXAM:  GENERAL: Comfortable, no acute distress   HEAD:  Normocephalic, atraumatic  EYES: EOMI, PERRLA  HEENT: Moist mucous membranes  NECK: Supple, No JVD  NERVOUS SYSTEM:  Alert & Oriented X3, Motor Strength 5/5 B/L upper and lower extremities  CHEST/LUNG: Clear to auscultation bilaterally  HEART: Regular rate and rhythm  ABDOMEN: Soft, non tender, Nondistended, Bowel sounds present  GENITOURINARY: Voiding, no palpable bladder  EXTREMITIES:   No clubbing, cyanosis, or edema  MUSCULOSKELETAL- right hip mepilax dsg c/d/i  SKIN-no rash    LABS:                        9.8    16.26 )-----------( 173      ( 12 Jun 2021 06:35 )             31.7     12 Jun 2021 06:35    136    |  104    |  31     ----------------------------<  137    4.5     |  26     |  1.20     Ca    8.5        12 Jun 2021 06:35    CAPILLARY BLOOD GLUCOSE  POCT Blood Glucose.: 131 mg/dL (11 Jun 2021 21:19)      MEDICATIONS  (STANDING):  acetaminophen   Tablet .. 975 milliGRAM(s) Oral every 8 hours  ascorbic acid 500 milliGRAM(s) Oral two times a day  calcium carbonate 1250 mG  + Vitamin D (OsCal 500 + D) 1 Tablet(s) Oral three times a day  escitalopram 10 milliGRAM(s) Oral daily  ferrous    sulfate 325 milliGRAM(s) Oral daily  folic acid 1 milliGRAM(s) Oral daily  lactated ringers. 1000 milliLiter(s) (75 mL/Hr) IV Continuous <Continuous>  multivitamin 1 Tablet(s) Oral daily  pantoprazole    Tablet 40 milliGRAM(s) Oral before breakfast  polyethylene glycol 3350 17 Gram(s) Oral at bedtime  rivaroxaban 10 milliGRAM(s) Oral <User Schedule>  senna 2 Tablet(s) Oral at bedtime  sodium chloride 0.9% lock flush 3 milliLiter(s) IV Push every 8 hours  ursodiol Tablet 500 milliGRAM(s) Oral two times a day    MEDICATIONS  (PRN):  HYDROmorphone  Injectable 0.5 milliGRAM(s) SubCutaneous every 4 hours PRN Severe Pain (7 - 10)  ondansetron Injectable 8 milliGRAM(s) IV Push every 8 hours PRN Nausea and/or Vomiting  oxyCODONE    IR 5 milliGRAM(s) Oral every 4 hours PRN Mild Pain (1 - 3)  oxyCODONE    IR 10 milliGRAM(s) Oral every 4 hours PRN Moderate Pain (4 - 6)    IMPRESSION: 82 yo female with above pmh a/w:  # right hip osteoarthritis  # S/P right THR  Ortho f/u appreciated  PT as tolerated  Pain meds prn  Monitor HH  AC by xarelto  Bowel regimen  Incentive spirometry    #acute blood loss anemia  D/T surgery  no need for transfusions presently  Monitor HH    # H/O ITP    was on prednisone x 15 yr    PLTS 161    monitor    #depression    cont home med    # HLD  cont statin    # Vte prophylaxis/factor 2 def  AC consult  [employer]  INC mobility    #Dispo- continue to mobilize with PT, [facility] vs home PT depending on the progress. D/w pt      
HPI:  Patient is a 81y old  Female who presents with a chief complaint of right hip pain s/p right total hip replacement 21.    Consulted by Dr. Hernandez for VTE prophylaxis, risk stratification, and anticoagulation management.    PAST MEDICAL & SURGICAL HISTORY:  Idiopathic thrombocytopenic purpura    Prothrombin 04840 gene mutation (Factor II mutation)    Acid reflux    Emphysema    Primary biliary cholangitis    Anxiety disorder    Pure hypercholesterolemia    Lymph nodes enlarged  in chest    Osteoarthritis    Mild emphysema    Spinal stenosis    Pulmonary nodule    S/P cholecystectomy      Status post placement of implantable loop recorder    removed 2018    H/O coronary angiogram      Status post cataract surgery  b/l    S/P colonoscopy    Interval History:  21: Patient seen at bedside on 2 [facility]. We discussed her history of "a genetic clotting" disorder. Dr. Conklin is heme. She has + prothrombin gene 18336 gene mutation aka Factor II gene mutation. She relays that her daughter had surgery and had post- op clots then discovered this genetic mutation. Patient denies any history of bleeding or clotting.   21: Patient seen at bedside OOB to chair about to work with PT. Patient- "I'm scared to stand, it burns and hurts." She is still unsure about home versus rehab as her home has 14 steps to enter. Discussed again high risk for VTE and importance of adherence to Xarelto. Patient verbalized agreement.    BMI: 38.3    CrCl:74.6    Incision Time:818  Procedure End Time:945  EBL:150ml    Caprini VTE Risk Score:  CAPRINI SCORE  AGE RELATED RISK FACTORS                                                       MOBILITY RELATED FACTORS  [ ] Age 41-60 years                                            (1 Point)                  [ ] Bed rest /restricted mobility                      (1 Point)  [ ] Age: 61-74 years                                           (2 Points)                [ ] Plaster cast                                                   (2 Points)  [x ] Age= 75 years                                              (3 Points)                 [ ] Bed bound for more than 72 hours                   (2 Points)    DISEASE RELATED RISK FACTORS                                               GENDER SPECIFIC FACTORS  [ ] Edema in the lower extremities                       (1 Point)           [ ] Pregnancy                                                            (1 Point)  [ ] Varicose veins                                               (1 Point)                  [ ] Post-partum < 6 weeks                                      (1 Point)             [ x] BMI > 25 Kg/m2                                            (1 Point)                  [ ] Hormonal therapy or oral contraception       (1 Point)                 [ ] Sepsis (in the previous month)                        (1 Point)             [ ] History of pregnancy complications                (1Point)  [ ] Pneumonia or serious lung disease                                             [ ] Unexplained or recurrent  (=/>3), premature                                 (In the previous month)                               (1 Point)                birth with toxemia or growth-restricted infant (1 Point)  [ ] Abnormal pulmonary function test            (1 Point)                                   SURGERY RELATED RISK FACTORS  [ ] Acute myocardial infarction                       (1 Point)                  [ ]  Section                                         (1 Point)  [ ] Congestive heart failure (in the previous month) (1 Point)   [ ] Minor surgery   lasting <45 minutes       (1 Point)   [ ] Inflammatory bowel disease                             (1 Point)          [ ] Arthroscopic surgery                                  (2 Points)  [ ] Central venous access                                    (2 Points)            [ ] General surgery lasting >45 minutes      (2 Points)       [ ] Stroke (in the previous month)                  (5 Points)            [x ] Elective major lower extremity arthroplasty (5 Points)                                   [  ] Malignancy (present or past include skin melanoma                                          but exclude  basal skin cell)    (2 points)                                      TRAUMA RELATED RISK FACTORS                HEMATOLOGY RELATED FACTORS                                  [ ] Fracture of the hip, pelvis, or leg                       (5 Points)  [ ] Prior episodes of VTE                                     (3 Points)          [ ] Acute spinal cord injury (in the previous month)  (5 Points)  [ ] Positive family history for VTE                         (3 Points)       [ ] Paralysis (less than 1 month)                          (5 Points)  [ x] Prothrombin 45441 A                                      (3 Points)         [ ] Multiple Trauma (within 1month)                 (5Points)                                                                                                                                                                [ ] Factor V Leiden                                          (3 Points)                                OTHER RISK FACTORS                          [ ] Lupus anticoagulants                                     (3 Points)                     [ ] BMI > 40                          (1 Point)                                                         [ ] Anticardiolipin antibodies                                (3 Points)                 [ ] Smoking                              (1Point)                                                [ ] High homocysteine in the blood                      (3 Points)                [  ] Diabetes requiring insulin (1point)                         [ ] Other congenital or acquired thrombophilia       (3 Points)          [  ] Chemotherapy                   (1 Point)  [ ] Heparin induced thrombocytopenia                  (3 Points)             [  ] Blood Transfusion                (1 point)                                                                                                             Total Score [     12     ]                                                                                                                                                                                                                                                                                                                                                                                                                                        IMPROVE Bleeding Risk Score:1.5      Falls Risk:   High ( x )  Mod (  )  Low (  )      FAMILY HISTORY:  FH: prothrombin gene mutation- daughter    Allergies    penicillamine (Rash (Mod to Severe))  sulfa drugs (Unknown)    Intolerances        REVIEW OF SYSTEMS    (  )Fever	        (  )Constipation	(  )SOB				  (  )Headache   (  )Dysuria  (  )Chills	        (  )Melena	        (  )Dyspnea on exertion (  )Dizziness    (  )Polyuria  (  )Nausea      (  )Hematochezia	(  )Cough                          (  )Syncope      (  )Hematuria  (  )Vomiting   (  )Chest Pain	        (  )Wheezing			  (  )Weakness  (  )Diarrhea    (  )Palpitations	(  )Anorexia			  (  x)Myalgia       ( x  ) Arthralgia    Pertinent positives in HPI and daily subjective. All other systems negative.    Vital Signs Last 24 Hrs  T(C): 37.4 (21 @ 09:26), Max: 37.4 (21 @ 09:26)  T(F): 99.4 (21 @ :26), Max: 99.4 (21 @ 09:26)  HR: 91 (21 @ :26) (91 - 107)  BP: 111/46 (21 @ 09:26) (109/55 - 144/75)  BP(mean): 61 (21 @ :26) (61 - 61)  RR: 16 (21 @ 09:26) (16 - 16)  SpO2: 98% (21 @ :26) (92% - 98%)    PHYSICAL EXAM:    Constitutional: Appears Well    Neurological: A& O x 3    Skin: Warm    Respiratory and Thorax: normal effort; Breath sounds: normal; No rales/wheezing/rhonchi  	  Cardiovascular: S1, S2, regular, NMBR	    Gastrointestinal: BS + x 4Q, nontender	    Genitourinary:  Bladder nondistended, nontender    Musculoskeletal:   General Right:   + muscle/joint tenderness,   normal tone, no joint swelling,   ROM: limited	    General Left:   no muscle/joint tenderness,   normal tone, no joint swelling,   ROM: full    Hip: Right: mepilex dressing CDI      Lower extrems:   Right: no calf tenderness              negative fiorella's sign               + pedal pulses    Left:   no calf tenderness              negative fiorella's sign               + pedal pulses                          9.8    16.26 )-----------( 173      ( 2021 06:35 )             31.7       06-12    136  |  104  |  31<H>  ----------------------------<  137<H>  4.5   |  26  |  1.20    Ca    8.5      2021 06:35                                9.9    7.67  )-----------( 161      ( 2021 10:14 )             32.6       06-11    141  |  108  |  27<H>  ----------------------------<  86  4.6   |  29  |  1.02    Ca    8.3<L>      2021 10:14        MEDICATIONS  (STANDING):  acetaminophen   Tablet .. 975 milliGRAM(s) Oral every 8 hours  ascorbic acid 500 milliGRAM(s) Oral two times a day  calcium carbonate 1250 mG  + Vitamin D (OsCal 500 + D) 1 Tablet(s) Oral three times a day  escitalopram 10 milliGRAM(s) Oral daily  famotidine    Tablet 20 milliGRAM(s) Oral once  ferrous    sulfate 325 milliGRAM(s) Oral daily  folic acid 1 milliGRAM(s) Oral daily  lactated ringers. 1000 milliLiter(s) IV Continuous <Continuous>  multivitamin 1 Tablet(s) Oral daily  pantoprazole    Tablet 40 milliGRAM(s) Oral before breakfast  polyethylene glycol 3350 17 Gram(s) Oral at bedtime  rivaroxaban 10 milliGRAM(s) Oral <User Schedule>  senna 2 Tablet(s) Oral at bedtime  sodium chloride 0.9% lock flush 3 milliLiter(s) IV Push every 8 hours  ursodiol Tablet 500 milliGRAM(s) Oral two times a day  vancomycin  IVPB 1000 milliGRAM(s) IV Intermittent once      **Current DVT Prophylaxis:    LMWH                   (  )  Heparin SQ           (  )  Coumadin             (  )  Xarelto                  (x  )  Eliquis                   (  )  Venodynes           ( x )  Ambulation          ( x )  UFH                       (  )  ECASA                   (  )  Contraindicated  (  )          
HPI:  Patient is a 81y old  Female who presents with a chief complaint of right hip pain s/p right total hip replacement 21.    Consulted by Dr. Hernandez for VTE prophylaxis, risk stratification, and anticoagulation management.  S/P colonoscopy    Interval History:  21: Patient seen at bedside on [facility]. We discussed her history of "a genetic clotting" disorder. Dr. Conklin is heme. She has + prothrombin gene 22257 gene mutation aka Factor II gene mutation. She relays that her daughter had surgery and had post- op clots then discovered this genetic mutation. Patient denies any history of bleeding or clotting.   21: Patient seen at bedside OOB to chair about to work with PT. Patient- "I'm scared to stand, it burns and hurts." She is still unsure about home versus rehab as her home has 14 steps to enter. Discussed again high risk for VTE and importance of adherence to Xarelto. Patient verbalized agreement.  : seen OOB to chair, without c/o, going home today    BMI: 38.3    CrCl:74.6    Incision Time:818  Procedure End Time:945  EBL:150ml    Caprini VTE Risk Score:  CAPRINI SCORE  AGE RELATED RISK FACTORS                                                       MOBILITY RELATED FACTORS  [ ] Age 41-60 years                                            (1 Point)                  [ ] Bed rest /restricted mobility                      (1 Point)  [ ] Age: 61-74 years                                           (2 Points)                [ ] Plaster cast                                                   (2 Points)  [x ] Age= 75 years                                              (3 Points)                 [ ] Bed bound for more than 72 hours                   (2 Points)    DISEASE RELATED RISK FACTORS                                               GENDER SPECIFIC FACTORS  [ ] Edema in the lower extremities                       (1 Point)           [ ] Pregnancy                                                            (1 Point)  [ ] Varicose veins                                               (1 Point)                  [ ] Post-partum < 6 weeks                                      (1 Point)             [ x] BMI > 25 Kg/m2                                            (1 Point)                  [ ] Hormonal therapy or oral contraception       (1 Point)                 [ ] Sepsis (in the previous month)                        (1 Point)             [ ] History of pregnancy complications                (1Point)  [ ] Pneumonia or serious lung disease                                             [ ] Unexplained or recurrent  (=/>3), premature                                 (In the previous month)                               (1 Point)                birth with toxemia or growth-restricted infant (1 Point)  [ ] Abnormal pulmonary function test            (1 Point)                                   SURGERY RELATED RISK FACTORS  [ ] Acute myocardial infarction                       (1 Point)                  [ ]  Section                                         (1 Point)  [ ] Congestive heart failure (in the previous month) (1 Point)   [ ] Minor surgery   lasting <45 minutes       (1 Point)   [ ] Inflammatory bowel disease                             (1 Point)          [ ] Arthroscopic surgery                                  (2 Points)  [ ] Central venous access                                    (2 Points)            [ ] General surgery lasting >45 minutes      (2 Points)       [ ] Stroke (in the previous month)                  (5 Points)            [x ] Elective major lower extremity arthroplasty (5 Points)                                   [  ] Malignancy (present or past include skin melanoma                                          but exclude  basal skin cell)    (2 points)                                      TRAUMA RELATED RISK FACTORS                HEMATOLOGY RELATED FACTORS                                  [ ] Fracture of the hip, pelvis, or leg                       (5 Points)  [ ] Prior episodes of VTE                                     (3 Points)          [ ] Acute spinal cord injury (in the previous month)  (5 Points)  [ ] Positive family history for VTE                         (3 Points)       [ ] Paralysis (less than 1 month)                          (5 Points)  [ x] Prothrombin 19355 A                                      (3 Points)         [ ] Multiple Trauma (within 1month)                 (5Points)                                                                                                                                                                [ ] Factor V Leiden                                          (3 Points)                                OTHER RISK FACTORS                          [ ] Lupus anticoagulants                                     (3 Points)                     [ ] BMI > 40                          (1 Point)                                                         [ ] Anticardiolipin antibodies                                (3 Points)                 [ ] Smoking                              (1Point)                                                [ ] High homocysteine in the blood                      (3 Points)                [  ] Diabetes requiring insulin (1point)                         [ ] Other congenital or acquired thrombophilia       (3 Points)          [  ] Chemotherapy                   (1 Point)  [ ] Heparin induced thrombocytopenia                  (3 Points)             [  ] Blood Transfusion                (1 point)                                                                                                             Total Score [     12     ]                                                                                                                                                                                                                                                                                                                                                                                                                                        IMPROVE Bleeding Risk Score:1.5      Falls Risk:   High ( x )  Mod (  )  Low (  )  REVIEW OF SYSTEMS    (  )Fever	        (  )Constipation	(  )SOB				  (  )Headache   (  )Dysuria  (  )Chills	        (  )Melena	        (  )Dyspnea on exertion (  )Dizziness    (  )Polyuria  (  )Nausea      (  )Hematochezia	(  )Cough                          (  )Syncope      (  )Hematuria  (  )Vomiting   (  )Chest Pain	        (  )Wheezing			  (  )Weakness  (  )Diarrhea    (  )Palpitations	(  )Anorexia	(X) joint pain		  (  x)Myalgia       ( x  ) Arthralgia    Pertinent positives in HPI and daily subjective. All other systems negative.    Vital Signs Last 24 Hrs  T(C): 36.8 (21 @ 09:18), Max: 36.8 (21 @ 09:18)  T(F): 98.2 (21 @ 09:18), Max: 98.2 (21 @ 09:18)  HR: 87 (21 @ 09:18) (86 - 88)  BP: 130/51 (21 @ 09:18) (130/51 - 165/58)  BP(mean): 72 (21 @ 09:18) (72 - 72)  RR: 16 (21 @ 09:18) (16 - 16)  SpO2: 97% (21 @ 09:18) (94% - 97%)      PHYSICAL EXAM:    Constitutional: Appears Well    Neurological: A& O x 3    Skin: Warm    Respiratory and Thorax: normal effort; Breath sounds: normal; No rales/wheezing/rhonchi  	  Cardiovascular: S1, S2, regular, NMBR	    Gastrointestinal: BS + x 4Q, nontender	    Genitourinary:  Bladder nondistended, nontender    Musculoskeletal:   General Right:   + muscle/joint tenderness,   normal tone, no joint swelling,   ROM: limited	    General Left:   no muscle/joint tenderness,   normal tone, no joint swelling,   ROM: full    Hip: Right: mepilex dressing CDI      Lower extrems:   Right: no calf tenderness              negative fiorella's sign               + pedal pulses    Left:   no calf tenderness              negative fiorella's sign               + pedal pulses    labs:                                9.6    12.57 )-----------( 138      ( 2021 06:51 )             30.8       06-    136  |  105  |  32<H>  ----------------------------<  117<H>  4.9   |  30  |  1.17    Ca    8.7      2021 06:51    MEDICATIONS  (STANDING):  acetaminophen   Tablet .. 975 milliGRAM(s) Oral every 8 hours  ascorbic acid 500 milliGRAM(s) Oral two times a day  calcium carbonate 1250 mG  + Vitamin D (OsCal 500 + D) 1 Tablet(s) Oral three times a day  escitalopram 10 milliGRAM(s) Oral daily  ferrous    sulfate 325 milliGRAM(s) Oral daily  folic acid 1 milliGRAM(s) Oral daily  lactated ringers. 1000 milliLiter(s) (75 mL/Hr) IV Continuous <Continuous>  multivitamin 1 Tablet(s) Oral daily  pantoprazole    Tablet 40 milliGRAM(s) Oral before breakfast  polyethylene glycol 3350 17 Gram(s) Oral at bedtime  rivaroxaban 10 milliGRAM(s) Oral <User Schedule>  senna 2 Tablet(s) Oral at bedtime  sodium chloride 0.9% lock flush 3 milliLiter(s) IV Push every 8 hours  ursodiol Tablet 500 milliGRAM(s) Oral two times a day    MEDICATIONS  (PRN):  bisacodyl Suppository 10 milliGRAM(s) Rectal once PRN Constipation  HYDROmorphone  Injectable 0.5 milliGRAM(s) SubCutaneous every 4 hours PRN Severe Pain (7 - 10)  ondansetron Injectable 8 milliGRAM(s) IV Push every 8 hours PRN Nausea and/or Vomiting  oxyCODONE    IR 5 milliGRAM(s) Oral every 4 hours PRN Mild Pain (1 - 3)  oxyCODONE    IR 10 milliGRAM(s) Oral every 4 hours PRN Moderate Pain (4 - 6)    **Current DVT Prophylaxis:    LMWH                   (  )  Heparin SQ           (  )  Coumadin             (  )  Xarelto                  (x  )  Eliquis                   (  )  Venodynes           ( x )  Ambulation          ( x )  UFH                       (  )  ECASA                   (  )  Contraindicated  (  )          
Patient seen and examined at bedside. Reports no acute complaints at this time. Pain is well controlled. No acute events overnight.    PE:  Gen: NAD, AAOx3  RLE:  Abduction pillow in place  Dressing clean dry intact  +EHL/FHL/TA/GS  SILT L3-S1  +DP/PT Pulses  Compartments soft  No calf TTP B/L
pt seen at bedside, doing well, in NAD, c/o pain to right hip, denies N/T RLE no other complaints    PE right hip   dressing c/d/i   compartments soft non tender  FROM ankle and toes  + EHL FHL GS TA   SILT   Dp intact   abduction pillow intact

## 2021-06-13 NOTE — PROGRESS NOTE ADULT - ASSESSMENT
A/P: 81F s/p R LORENA POD 2    Analgesia PRN  DVT ppx Xarelto per AC team recommendations  WBAT/PT  Posterior hip precautions  Abduction pillow  Encourage incentive spirometry  Dispo: Home per PT  Will discuss with attending Dr. Hernandez and advise if plan changes 
A: 81F s/p Right LORENA     P:  WBAT RLE   therapy   posterior hip precautions   DVT ppx  post op abx   abduction pillow   pain control  follow labs   veondynes  incentive spirometer
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 She is low risk for bleeding.    Discussed Xarelto and the risks and benefits with patient. Emphasized the importance of taking medication daily to prevent VTE. Verbalized understanding and is in agreement with treatment plan.    Plan: goinghome today  ::Xarelto 10 mg PO daily x 35 days total starting 6/11/21 with last dose---->7/15/21  script for Xarelto sent to pt's pharmacy, d/w amt of copay and she is agreeable to cost   CBC/BMP to be drawn this week by [facility] with f/u by our service   A/C services to monitor pt weekly via home phone calls while on Xarelto  ::PPI- Protonix 40 mg daily    Will continue to follow.  Dispo: Home 
This is a 81 year old female s/p right total hip replacement 6/11 with high risk for VTE due to history prothrombin gene 66450 mutation, BMI, age, impaired mobility, and surgery. She is low risk for bleeding.    Discussed Xarelto and the risks and benefits with patient. Emphasized the importance of taking medication daily to prevent VTE. Verbalized understanding and is in agreement with treatment plan.    Plan:  ::Xarelto 10 mg PO daily x 35 days total starting 6/11/21 with last dose---->7/15/21  ::PPI- Protonix 40 mg daily  ::Daily CBC/BMP  ::Enc. ambulation  ::Venbrigette    Will continue to follow.  Dispo: Home versus rehab
A/P: 81F s/p R LORENA POD 1  Analgesia  DVT ppx  WBAT  Posterior hip precautions  Abduction pillow  PT/OT  Encourage incentive spirometry  DC planning pending PT eval  Will discuss with attending and advise if plan changes

## 2021-06-13 NOTE — DISCHARGE NOTE NURSING/CASE MANAGEMENT/SOCIAL WORK - PATIENT PORTAL LINK FT
You can access the FollowMyHealth Patient Portal offered by [facility] by registering at the following website: [email or web address]. By joining [employer]’s FollowMyHealth portal, you will also be able to view your health information using other applications (apps) compatible with our system.

## 2021-06-19 ENCOUNTER — EMERGENCY (EMERGENCY)
Facility: HOSPITAL | Age: 81
LOS: 0 days | Discharge: HOME | End: 2021-06-19
Attending: EMERGENCY MEDICINE
Payer: MEDICARE

## 2021-06-19 VITALS
WEIGHT: 233.69 LBS | HEIGHT: 65.5 IN | TEMPERATURE: 98 F | DIASTOLIC BLOOD PRESSURE: 76 MMHG | OXYGEN SATURATION: 94 % | SYSTOLIC BLOOD PRESSURE: 166 MMHG | RESPIRATION RATE: 20 BRPM | HEART RATE: 105 BPM

## 2021-06-19 DIAGNOSIS — F41.9 ANXIETY DISORDER, UNSPECIFIED: ICD-10-CM

## 2021-06-19 DIAGNOSIS — Z87.19 PERSONAL HISTORY OF OTHER DISEASES OF THE DIGESTIVE SYSTEM: ICD-10-CM

## 2021-06-19 DIAGNOSIS — Z95.818 PRESENCE OF OTHER CARDIAC IMPLANTS AND GRAFTS: Chronic | ICD-10-CM

## 2021-06-19 DIAGNOSIS — Z98.890 OTHER SPECIFIED POSTPROCEDURAL STATES: Chronic | ICD-10-CM

## 2021-06-19 DIAGNOSIS — Z87.2 PERSONAL HISTORY OF DISEASES OF THE SKIN AND SUBCUTANEOUS TISSUE: ICD-10-CM

## 2021-06-19 DIAGNOSIS — Z88.2 ALLERGY STATUS TO SULFONAMIDES: ICD-10-CM

## 2021-06-19 DIAGNOSIS — Z90.49 ACQUIRED ABSENCE OF OTHER SPECIFIED PARTS OF DIGESTIVE TRACT: Chronic | ICD-10-CM

## 2021-06-19 DIAGNOSIS — Z87.09 PERSONAL HISTORY OF OTHER DISEASES OF THE RESPIRATORY SYSTEM: ICD-10-CM

## 2021-06-19 DIAGNOSIS — Z88.0 ALLERGY STATUS TO PENICILLIN: ICD-10-CM

## 2021-06-19 DIAGNOSIS — M79.89 OTHER SPECIFIED SOFT TISSUE DISORDERS: ICD-10-CM

## 2021-06-19 DIAGNOSIS — J43.9 EMPHYSEMA, UNSPECIFIED: ICD-10-CM

## 2021-06-19 DIAGNOSIS — Z98.49 CATARACT EXTRACTION STATUS, UNSPECIFIED EYE: Chronic | ICD-10-CM

## 2021-06-19 DIAGNOSIS — E78.00 PURE HYPERCHOLESTEROLEMIA, UNSPECIFIED: ICD-10-CM

## 2021-06-19 DIAGNOSIS — Z79.899 OTHER LONG TERM (CURRENT) DRUG THERAPY: ICD-10-CM

## 2021-06-19 DIAGNOSIS — Z87.39 PERSONAL HISTORY OF OTHER DISEASES OF THE MUSCULOSKELETAL SYSTEM AND CONNECTIVE TISSUE: ICD-10-CM

## 2021-06-19 DIAGNOSIS — Z96.641 PRESENCE OF RIGHT ARTIFICIAL HIP JOINT: ICD-10-CM

## 2021-06-19 DIAGNOSIS — Z86.2 PERSONAL HISTORY OF DISEASES OF THE BLOOD AND BLOOD-FORMING ORGANS AND CERTAIN DISORDERS INVOLVING THE IMMUNE MECHANISM: ICD-10-CM

## 2021-06-19 DIAGNOSIS — K21.9 GASTRO-ESOPHAGEAL REFLUX DISEASE WITHOUT ESOPHAGITIS: ICD-10-CM

## 2021-06-19 PROCEDURE — 99284 EMERGENCY DEPT VISIT MOD MDM: CPT

## 2021-06-19 PROCEDURE — 93970 EXTREMITY STUDY: CPT | Mod: 26

## 2021-06-19 PROCEDURE — 93970 EXTREMITY STUDY: CPT

## 2021-06-19 PROCEDURE — 99284 EMERGENCY DEPT VISIT MOD MDM: CPT | Mod: 25

## 2021-06-19 NOTE — ED PROVIDER NOTE - CONSTITUTIONAL, MLM
· Med Name & Dose: ciltalopram 40 mg one tablet daily  · Last refill was 3-5-18 Number of Refills sent: 5  · Quantity of medication given 30 day supply  · Last visit for that condition 6-4-18  · Date of next appt: 6-6-19  · Date of any Lab results pertaining to medication: 7-2-18    Will refill until next appointment per discussion with                normal... Well appearing, awake, alert, oriented to person, place, time/situation and in no apparent distress.

## 2021-06-19 NOTE — ED PROVIDER NOTE - OBJECTIVE STATEMENT
82 y/o female with a PMHx of acid reflux, anxiety disorder, emphysema, idiopathic thrombocytopenic purpura, lymph nodes enlarged, osteoarthritis, primary biliary cholangitis, pulmonary nodule, pure hypercholesterolemia, spinal stenosis presents to the ED c/o b/l LE swelling. Pt s/p right total hip replacement on 6/11 with Dr. Hernandez. No other complaints at this time.

## 2021-06-19 NOTE — ED PROVIDER NOTE - PATIENT PORTAL LINK FT
You can access the FollowMyHealth Patient Portal offered by [facility] by registering at the following website: [email or web address]. By joining "[employer]’s FollowMyHealth portal, you will also be able to view your health information using other applications (apps) compatible with our system.

## 2021-06-19 NOTE — ED PROVIDER NOTE - NS_ ATTENDINGSCRIBEDETAILS _ED_A_ED_FT
I, Francisco Ramos MD,  performed the initial face to face bedside interview with this patient regarding history of present illness, review of symptoms and relevant past medical, social and family history.  I completed an independent physical examination.    The history, relevant review of systems, past medical and surgical history, medical decision making, and physical examination was documented by the scribe in my presence and I attest to the accuracy of the documentation.

## 2021-06-19 NOTE — ED ADULT NURSE NOTE - CHIEF COMPLAINT QUOTE
Pt HAWA, A&O x4. presents to ED for L hip pain with L knee swelling, pain and bruising. pt had a L hip replacement last Friday at . is able to ambulate. denies known fall or injury, denies fever. takes xarelto.

## 2021-06-19 NOTE — ED PROVIDER NOTE - CARE PROVIDER_API CALL
Esteban Hernandez)  Orthopaedic Surgery  [address] [place] [address] [place] [address]  Phone: [phone]  Fax: [phone]  Follow Up Time:

## 2021-06-19 NOTE — ED ADULT TRIAGE NOTE - CHIEF COMPLAINT QUOTE
Pt HAWA, A&O x4. presents to ED for L hip pain with L knee swelling, pain and bruising. pt had a L hip replacement last Friday at . is able to ambulate. denies known fall or injury. takes xarelto. Pt HAWA, A&O x4. presents to ED for L hip pain with L knee swelling, pain and bruising. pt had a L hip replacement last Friday at . is able to ambulate. denies known fall or injury, denies fever. takes xarelto.

## 2021-06-19 NOTE — ED PROVIDER NOTE - SKIN, MLM
Skin warm, dry and intact. No evidence of rash. 2+ pitting edema b/l LE. Ecchymosis right knee. Site clean dry and intact right hip.

## 2021-06-19 NOTE — ED CLERICAL - NS ED CLERK NOTE PRE-ARRIVAL INFORMATION; ADDITIONAL PRE-ARRIVAL INFORMATION
This patient is enrolled in the comprehensive joint replacement (CJR) program and has active care navigation.  This patient can be followed up by the care navigation team within 24 hours. To arrange close follow-up or to obtain additional clinical information about this patient, please call the contact number above.   Please call the hospitalist for medical consultation ([phone]) PRIOR to admission or observation.  The hospitalist is part of the care team and can assist in acute medical management.   Please call the orthopedic team () for ALL patients who require admission.

## 2021-06-19 NOTE — ED PROVIDER NOTE - PMH
Acid reflux    Anxiety disorder    Emphysema    Idiopathic thrombocytopenic purpura    Lymph nodes enlarged  in chest  Mild emphysema    Osteoarthritis    Primary biliary cholangitis    Pulmonary nodule    Pure hypercholesterolemia    Spinal stenosis

## 2021-06-19 NOTE — ED PROVIDER NOTE - PROGRESS NOTE DETAILS
Che Paniagua for attending Dr. Ramos: Spoke with ortho resident who agrees with plan. Will sono b/l LE and reassess. Che Paniagua for attending Dr. Ramos: Spoke with ortho resident who agrees with plan. States to US both legs and if negative patient is to follow up outpatient. Will sono b/l LE and reassess. Che Paniagua for attending Dr. Ramos: Spoke with ortho resident, Enrique who agrees with plan. States to US both legs and if negative patient is to follow up outpatient. Will sono b/l LE and reassess.

## 2021-06-22 DIAGNOSIS — M16.11 UNILATERAL PRIMARY OSTEOARTHRITIS, RIGHT HIP: ICD-10-CM

## 2021-06-22 DIAGNOSIS — M48.00 SPINAL STENOSIS, SITE UNSPECIFIED: ICD-10-CM

## 2021-06-22 DIAGNOSIS — D68.52 PROTHROMBIN GENE MUTATION: ICD-10-CM

## 2021-06-22 DIAGNOSIS — J43.9 EMPHYSEMA, UNSPECIFIED: ICD-10-CM

## 2021-06-22 DIAGNOSIS — D69.3 IMMUNE THROMBOCYTOPENIC PURPURA: ICD-10-CM

## 2021-06-22 DIAGNOSIS — Z88.0 ALLERGY STATUS TO PENICILLIN: ICD-10-CM

## 2021-06-22 DIAGNOSIS — D62 ACUTE POSTHEMORRHAGIC ANEMIA: ICD-10-CM

## 2021-06-22 DIAGNOSIS — F32.9 MAJOR DEPRESSIVE DISORDER, SINGLE EPISODE, UNSPECIFIED: ICD-10-CM

## 2021-06-22 DIAGNOSIS — E78.5 HYPERLIPIDEMIA, UNSPECIFIED: ICD-10-CM

## 2021-06-22 DIAGNOSIS — Z88.2 ALLERGY STATUS TO SULFONAMIDES: ICD-10-CM

## 2021-07-08 ENCOUNTER — RX RENEWAL (OUTPATIENT)
Age: 81
End: 2021-07-08

## 2022-03-22 NOTE — ASU PATIENT PROFILE, ADULT - TEACHING/LEARNING RELIGIOUS CONSIDERATIONS
Assessment completed per SGNA guidelines   
Assessment done per SGNA guidelines. Breathing nonlabored. Skin warm and dry.    
Waiting for transportation home postop GI procedure   
none

## 2022-04-12 NOTE — ASU PATIENT PROFILE, ADULT - TOBACCO USE
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

## 2022-07-26 NOTE — ED ADULT TRIAGE NOTE - MODE OF ARRIVAL
Private Vehicle
You can access the FollowMyHealth Patient Portal offered by [facility] by registering at the following website: [email or web address]. By joining [employer]’s FollowMyHealth portal, you will also be able to view your health information using other applications (apps) compatible with our system.

## 2022-08-30 NOTE — CONSULT NOTE ADULT - ATTENDING COMMENTS
Patient is seen and examined at bedside with NP Saadia Quintero  82yo/F who presented for elective RT THR.   Seen postop, was up and participated with PT.   In a lot of pain  Continue to mobilize with PT  Agree with assessment and plan. D/w pt Doxycycline Pregnancy And Lactation Text: This medication is Pregnancy Category D and not consider safe during pregnancy. It is also excreted in breast milk but is considered safe for shorter treatment courses.

## 2022-09-02 ENCOUNTER — INPATIENT (INPATIENT)
Facility: HOSPITAL | Age: 82
LOS: 1 days | Discharge: HOME | DRG: 194 | End: 2022-09-04
Attending: INTERNAL MEDICINE | Admitting: FAMILY MEDICINE
Payer: MEDICARE

## 2022-09-02 VITALS — WEIGHT: 222.01 LBS | HEIGHT: 65.5 IN

## 2022-09-02 DIAGNOSIS — Z98.890 OTHER SPECIFIED POSTPROCEDURAL STATES: Chronic | ICD-10-CM

## 2022-09-02 DIAGNOSIS — Z90.49 ACQUIRED ABSENCE OF OTHER SPECIFIED PARTS OF DIGESTIVE TRACT: Chronic | ICD-10-CM

## 2022-09-02 DIAGNOSIS — J18.9 PNEUMONIA, UNSPECIFIED ORGANISM: ICD-10-CM

## 2022-09-02 DIAGNOSIS — Z95.818 PRESENCE OF OTHER CARDIAC IMPLANTS AND GRAFTS: Chronic | ICD-10-CM

## 2022-09-02 DIAGNOSIS — Z98.49 CATARACT EXTRACTION STATUS, UNSPECIFIED EYE: Chronic | ICD-10-CM

## 2022-09-02 LAB
ALBUMIN SERPL ELPH-MCNC: 3.4 G/DL — SIGNIFICANT CHANGE UP (ref 3.3–5)
ALP SERPL-CCNC: 107 U/L — SIGNIFICANT CHANGE UP (ref 40–120)
ALT FLD-CCNC: 23 U/L — SIGNIFICANT CHANGE UP (ref 12–78)
ANION GAP SERPL CALC-SCNC: 7 MMOL/L — SIGNIFICANT CHANGE UP (ref 5–17)
APPEARANCE UR: ABNORMAL
APTT BLD: 33.3 SEC — SIGNIFICANT CHANGE UP (ref 27.5–35.5)
AST SERPL-CCNC: 29 U/L — SIGNIFICANT CHANGE UP (ref 15–37)
BASOPHILS # BLD AUTO: 0.05 K/UL — SIGNIFICANT CHANGE UP (ref 0–0.2)
BASOPHILS NFR BLD AUTO: 0.3 % — SIGNIFICANT CHANGE UP (ref 0–2)
BILIRUB SERPL-MCNC: 0.4 MG/DL — SIGNIFICANT CHANGE UP (ref 0.2–1.2)
BILIRUB UR-MCNC: NEGATIVE — SIGNIFICANT CHANGE UP
BUN SERPL-MCNC: 29 MG/DL — HIGH (ref 7–23)
CALCIUM SERPL-MCNC: 8.9 MG/DL — SIGNIFICANT CHANGE UP (ref 8.5–10.1)
CHLORIDE SERPL-SCNC: 101 MMOL/L — SIGNIFICANT CHANGE UP (ref 96–108)
CO2 SERPL-SCNC: 27 MMOL/L — SIGNIFICANT CHANGE UP (ref 22–31)
COLOR SPEC: YELLOW — SIGNIFICANT CHANGE UP
CREAT SERPL-MCNC: 1.04 MG/DL — SIGNIFICANT CHANGE UP (ref 0.5–1.3)
DIFF PNL FLD: ABNORMAL
EGFR: 54 ML/MIN/1.73M2 — LOW
EOSINOPHIL # BLD AUTO: 0.01 K/UL — SIGNIFICANT CHANGE UP (ref 0–0.5)
EOSINOPHIL NFR BLD AUTO: 0.1 % — SIGNIFICANT CHANGE UP (ref 0–6)
FLUAV AG NPH QL: SIGNIFICANT CHANGE UP
FLUBV AG NPH QL: SIGNIFICANT CHANGE UP
GLUCOSE SERPL-MCNC: 133 MG/DL — HIGH (ref 70–99)
GLUCOSE UR QL: NEGATIVE — SIGNIFICANT CHANGE UP
HCT VFR BLD CALC: 38.6 % — SIGNIFICANT CHANGE UP (ref 34.5–45)
HGB BLD-MCNC: 11.9 G/DL — SIGNIFICANT CHANGE UP (ref 11.5–15.5)
IMM GRANULOCYTES NFR BLD AUTO: 0.5 % — SIGNIFICANT CHANGE UP (ref 0–1.5)
INR BLD: 1.2 RATIO — HIGH (ref 0.88–1.16)
KETONES UR-MCNC: NEGATIVE — SIGNIFICANT CHANGE UP
LACTATE SERPL-SCNC: 2.1 MMOL/L — HIGH (ref 0.7–2)
LEUKOCYTE ESTERASE UR-ACNC: ABNORMAL
LYMPHOCYTES # BLD AUTO: 0.5 K/UL — LOW (ref 1–3.3)
LYMPHOCYTES # BLD AUTO: 3.5 % — LOW (ref 13–44)
MCHC RBC-ENTMCNC: 24.4 PG — LOW (ref 27–34)
MCHC RBC-ENTMCNC: 30.8 GM/DL — LOW (ref 32–36)
MCV RBC AUTO: 79.3 FL — LOW (ref 80–100)
MONOCYTES # BLD AUTO: 0.69 K/UL — SIGNIFICANT CHANGE UP (ref 0–0.9)
MONOCYTES NFR BLD AUTO: 4.8 % — SIGNIFICANT CHANGE UP (ref 2–14)
NEUTROPHILS # BLD AUTO: 13 K/UL — HIGH (ref 1.8–7.4)
NEUTROPHILS NFR BLD AUTO: 90.8 % — HIGH (ref 43–77)
NITRITE UR-MCNC: NEGATIVE — SIGNIFICANT CHANGE UP
PH UR: 6 — SIGNIFICANT CHANGE UP (ref 5–8)
PLATELET # BLD AUTO: 171 K/UL — SIGNIFICANT CHANGE UP (ref 150–400)
POTASSIUM SERPL-MCNC: 4.7 MMOL/L — SIGNIFICANT CHANGE UP (ref 3.5–5.3)
POTASSIUM SERPL-SCNC: 4.7 MMOL/L — SIGNIFICANT CHANGE UP (ref 3.5–5.3)
PROT SERPL-MCNC: 7.7 GM/DL — SIGNIFICANT CHANGE UP (ref 6–8.3)
PROT UR-MCNC: 15
PROTHROM AB SERPL-ACNC: 13.9 SEC — HIGH (ref 10.5–13.4)
RBC # BLD: 4.87 M/UL — SIGNIFICANT CHANGE UP (ref 3.8–5.2)
RBC # FLD: 15.3 % — HIGH (ref 10.3–14.5)
RSV RNA NPH QL NAA+NON-PROBE: SIGNIFICANT CHANGE UP
SARS-COV-2 RNA SPEC QL NAA+PROBE: SIGNIFICANT CHANGE UP
SODIUM SERPL-SCNC: 135 MMOL/L — SIGNIFICANT CHANGE UP (ref 135–145)
SP GR SPEC: 1.01 — SIGNIFICANT CHANGE UP (ref 1.01–1.02)
TROPONIN I, HIGH SENSITIVITY RESULT: 21.51 NG/L — SIGNIFICANT CHANGE UP
UROBILINOGEN FLD QL: NEGATIVE — SIGNIFICANT CHANGE UP
WBC # BLD: 14.32 K/UL — HIGH (ref 3.8–10.5)
WBC # FLD AUTO: 14.32 K/UL — HIGH (ref 3.8–10.5)

## 2022-09-02 PROCEDURE — 70450 CT HEAD/BRAIN W/O DYE: CPT | Mod: 26,MA

## 2022-09-02 PROCEDURE — 87899 AGENT NOS ASSAY W/OPTIC: CPT

## 2022-09-02 PROCEDURE — 87449 NOS EACH ORGANISM AG IA: CPT

## 2022-09-02 PROCEDURE — 36415 COLL VENOUS BLD VENIPUNCTURE: CPT

## 2022-09-02 PROCEDURE — 71275 CT ANGIOGRAPHY CHEST: CPT | Mod: 26,MA

## 2022-09-02 PROCEDURE — 99285 EMERGENCY DEPT VISIT HI MDM: CPT | Mod: CS

## 2022-09-02 PROCEDURE — 80053 COMPREHEN METABOLIC PANEL: CPT

## 2022-09-02 PROCEDURE — 99222 1ST HOSP IP/OBS MODERATE 55: CPT

## 2022-09-02 PROCEDURE — 85025 COMPLETE CBC W/AUTO DIFF WBC: CPT

## 2022-09-02 PROCEDURE — 90662 IIV NO PRSV INCREASED AG IM: CPT

## 2022-09-02 PROCEDURE — 71045 X-RAY EXAM CHEST 1 VIEW: CPT | Mod: 26

## 2022-09-02 RX ORDER — ESCITALOPRAM OXALATE 10 MG/1
10 TABLET, FILM COATED ORAL DAILY
Refills: 0 | Status: DISCONTINUED | OUTPATIENT
Start: 2022-09-02 | End: 2022-09-04

## 2022-09-02 RX ORDER — ACETAMINOPHEN 500 MG
650 TABLET ORAL EVERY 6 HOURS
Refills: 0 | Status: DISCONTINUED | OUTPATIENT
Start: 2022-09-02 | End: 2022-09-04

## 2022-09-02 RX ORDER — PSYLLIUM SEED (WITH DEXTROSE)
0 POWDER (GRAM) ORAL
Qty: 0 | Refills: 0 | DISCHARGE

## 2022-09-02 RX ORDER — URSODIOL 250 MG/1
0 TABLET, FILM COATED ORAL
Qty: 0 | Refills: 0 | DISCHARGE

## 2022-09-02 RX ORDER — PANTOPRAZOLE SODIUM 20 MG/1
40 TABLET, DELAYED RELEASE ORAL
Refills: 0 | Status: DISCONTINUED | OUTPATIENT
Start: 2022-09-02 | End: 2022-09-04

## 2022-09-02 RX ORDER — AZITHROMYCIN 500 MG/1
TABLET, FILM COATED ORAL
Refills: 0 | Status: DISCONTINUED | OUTPATIENT
Start: 2022-09-03 | End: 2022-09-04

## 2022-09-02 RX ORDER — CHOLECALCIFEROL (VITAMIN D3) 125 MCG
1 CAPSULE ORAL
Qty: 0 | Refills: 0 | DISCHARGE

## 2022-09-02 RX ORDER — CEFTRIAXONE 500 MG/1
1000 INJECTION, POWDER, FOR SOLUTION INTRAMUSCULAR; INTRAVENOUS ONCE
Refills: 0 | Status: COMPLETED | OUTPATIENT
Start: 2022-09-02 | End: 2022-09-02

## 2022-09-02 RX ORDER — LANOLIN ALCOHOL/MO/W.PET/CERES
3 CREAM (GRAM) TOPICAL AT BEDTIME
Refills: 0 | Status: DISCONTINUED | OUTPATIENT
Start: 2022-09-02 | End: 2022-09-04

## 2022-09-02 RX ORDER — URSODIOL 250 MG/1
500 TABLET, FILM COATED ORAL
Refills: 0 | Status: DISCONTINUED | OUTPATIENT
Start: 2022-09-02 | End: 2022-09-04

## 2022-09-02 RX ORDER — SODIUM CHLORIDE 9 MG/ML
2000 INJECTION INTRAMUSCULAR; INTRAVENOUS; SUBCUTANEOUS ONCE
Refills: 0 | Status: COMPLETED | OUTPATIENT
Start: 2022-09-02 | End: 2022-09-02

## 2022-09-02 RX ORDER — CEFTRIAXONE 500 MG/1
1000 INJECTION, POWDER, FOR SOLUTION INTRAMUSCULAR; INTRAVENOUS ONCE
Refills: 0 | Status: DISCONTINUED | OUTPATIENT
Start: 2022-09-02 | End: 2022-09-02

## 2022-09-02 RX ORDER — AZITHROMYCIN 500 MG/1
500 TABLET, FILM COATED ORAL ONCE
Refills: 0 | Status: COMPLETED | OUTPATIENT
Start: 2022-09-02 | End: 2022-09-02

## 2022-09-02 RX ORDER — ONDANSETRON 8 MG/1
4 TABLET, FILM COATED ORAL EVERY 8 HOURS
Refills: 0 | Status: DISCONTINUED | OUTPATIENT
Start: 2022-09-02 | End: 2022-09-04

## 2022-09-02 RX ORDER — ESCITALOPRAM OXALATE 10 MG/1
1 TABLET, FILM COATED ORAL
Qty: 0 | Refills: 0 | DISCHARGE

## 2022-09-02 RX ORDER — MULTIVIT-MIN/FERROUS GLUCONATE 9 MG/15 ML
1 LIQUID (ML) ORAL
Qty: 0 | Refills: 0 | DISCHARGE

## 2022-09-02 RX ORDER — CEFTRIAXONE 500 MG/1
1000 INJECTION, POWDER, FOR SOLUTION INTRAMUSCULAR; INTRAVENOUS EVERY 24 HOURS
Refills: 0 | Status: DISCONTINUED | OUTPATIENT
Start: 2022-09-02 | End: 2022-09-04

## 2022-09-02 RX ADMIN — CEFTRIAXONE 100 MILLIGRAM(S): 500 INJECTION, POWDER, FOR SOLUTION INTRAMUSCULAR; INTRAVENOUS at 19:20

## 2022-09-02 RX ADMIN — SODIUM CHLORIDE 2000 MILLILITER(S): 9 INJECTION INTRAMUSCULAR; INTRAVENOUS; SUBCUTANEOUS at 14:45

## 2022-09-02 RX ADMIN — AZITHROMYCIN 255 MILLIGRAM(S): 500 TABLET, FILM COATED ORAL at 19:39

## 2022-09-02 NOTE — H&P ADULT - NSHPPHYSICALEXAM_GEN_ALL_CORE
Vital Signs Last 24 Hrs  T(C): 36.7 (02 Sep 2022 17:45), Max: 36.8 (02 Sep 2022 13:16)  T(F): 98 (02 Sep 2022 17:45), Max: 98.2 (02 Sep 2022 13:16)  HR: 90 (02 Sep 2022 17:45) (77 - 95)  BP: 152/77 (02 Sep 2022 17:45) (105/57 - 152/77)  BP(mean): 97 (02 Sep 2022 17:45) (73 - 97)  RR: 18 (02 Sep 2022 17:45) (18 - 18)  SpO2: 97% (02 Sep 2022 17:45) (90% - 97%)    Parameters below as of 02 Sep 2022 17:45  Patient On (Oxygen Delivery Method): room air

## 2022-09-02 NOTE — ED PROVIDER NOTE - NS ED ROS FT
Constitutional: negative for fevers/chills, +fatigue   HENT: no hearing problems, sore throat, nasal congestion  Eyes: no visual disturbances  Neck: no neck stiffness or neck pain  Cardiovascular: no chest pain, no palpitations, no edema. +hypotensive   Respiratory: no cough, no shortness of breath  Musculoskeletal: no back pain, no pain of extremities  Gastrointestinal: no abdominal pain, vomiting. +intermittent black stool, +nausea.  : no dysuria or hematuria or polyuria  Neuro: no headaches, no numbness or tingling, no dizziness. +lightheadedness   Skin: no rashes or wounds

## 2022-09-02 NOTE — ED STATDOCS - PROGRESS NOTE DETAILS
Che Mcleod for attending Dr. Nieves   81 yo female w/PMHx of GERD, ITP, cholecystectomy, presents to the ED c/o nausea and generalized weakness since last night. Pt had dinner last night normally but gradually after dinner pt began to feel nauseous, weak, but denies vomiting. Pt denies coughing or SOB. Pt felt like she was unsteady. Denies fevers. Pt has bowel issues and is being followed by OP GI. Denies CP. Pt felt lightheaded last night but has resolved. Will send pt to main ED for further evaluation.

## 2022-09-02 NOTE — H&P ADULT - HISTORY OF PRESENT ILLNESS
83 y/o F PMHx significant for CAD, Hyperlipidemia, ITP, factor 2 (Prothrombin gene) deficiency, heterozygous, biliary cirrhosis, intermittent claudication, COPD, GERD, and OA presents to the [facility] for further evaluation and management of c/o increased fatigue, lightheadedness, and nausea upon awakening this morning. As noted the patient noted his blood pressure to be lower than normal => 105mmHg. The patient additionally c/o intermittent episodes of dark stool.  Vital signs in triage => /57, HR 95/min, RR 18/min, TMax 98.2'F, SpO2 90%. Labs => WBC 14.32, MCV/MCH 79.3/24.4, LA 2.1 -> 1.0, BUN/Cr 29/1.04. CT Head => No acute abnormality. Mild periventricular white matter ischemia. Minimal mucosal thickening in bilateral maxillary sinuses. CTA PE Protocol => 1. No pulmonary thromboembolism. 2. New findings in the left lung compatible with multifocal bronchopneumonia. 3. Emphysema. In the ED the patient received Ceftriaxone 1g IVPB x 1, Azithromycin 500mg IVPB x 1, and NS x 2L.

## 2022-09-02 NOTE — ED PROVIDER NOTE - PHYSICAL EXAMINATION
Constitutional: NAD AAOx3  Eyes: PERRLA EOMI  Head: Normocephalic atraumatic  Mouth: MMM  Cardiac: regular rate   Resp: Lungs CTAB  GI: Abd s/nt/nd, guaiac test negative lot [id number] expires 1/31/23 QC positive  Neuro: CN2-12 intact  Skin: No visible rashes

## 2022-09-02 NOTE — ED PROVIDER NOTE - OBJECTIVE STATEMENT
83 y/o female with a PMHx of acid reflux, IBS emphysema, ITP, OA, cholecystectomy, hip replacement presents to the ED c/o low blood pressure and lightheadedness. Pt went out to dinner and had one glass of wine. Pt felt fine went home to watch TV around 11PM went upstairs fell asleep woke up around 10-11AM felt sudden onset of lightheadedness, checked blood pressure, which was 105. Pt endorses nausea, fatigue, intermittent black stool no hx of ulcers, no fever, no CP, no SOB, former smoker.

## 2022-09-02 NOTE — ED ADULT NURSE NOTE - CHIEF COMPLAINT QUOTE
Pt comes to the ED complaining of nausea and generalized not feeling well., Pt states that she went out to eat last night and in the middle of the night she got up feeling as if she had to vomit, but couldn't. Pt states that she continues to feel nauseous and weak.

## 2022-09-02 NOTE — ED ADULT NURSE NOTE - OBJECTIVE STATEMENT
81 y/o female awake alert and oriented x4 presents to ED c/o generalized weakness and nausea started last night. pt states she had dinner last night and started to feel nauseous and weak. Denies abd pain,  vomiting, diarrhea, fever/chills or urinary sx, cough, SOB, chest pain. hx acid reflux. Pt also felt lightheaded last night but sx resolved today. not on blood thinners. pt noticed intermittent episodes of dark stools.

## 2022-09-02 NOTE — ED PROVIDER NOTE - PROGRESS NOTE DETAILS
Che Jim for attending Dr. Carrera: Chest XR neg read by Dr. Peguero radiologist, Che Jim for attending Dr. Carrera: Blood pressure improved pulsox 92% on room air pt states she feels much better, is not sob is not dizzy, could walk to bathroom without dizziness and would like to go home. Will repeat the pulsox in a different area.

## 2022-09-02 NOTE — ED PROVIDER NOTE - PATIENT PORTAL LINK FT
You can access the FollowMyHealth Patient Portal offered by [facility] by registering at the following website: [email or web address]. By joining [employer]’s FollowMyHealth portal, you will also be able to view your health information using other applications (apps) compatible with our system.

## 2022-09-02 NOTE — ED PROVIDER NOTE - CLINICAL SUMMARY MEDICAL DECISION MAKING FREE TEXT BOX
Pt with hx of IBS and acid reflux here with lightheadedness r/o stroke r/o infection r/o GI bleed labs CT head cardiac enzymes.

## 2022-09-02 NOTE — H&P ADULT - ASSESSMENT
81 y/o F PMHx significant for CAD, Hyperlipidemia, ITP, factor 2 (Prothrombin gene) deficiency, heterozygous, biliary cirrhosis, intermittent claudication, COPD, GERD, and OA presents to the [facility] for further evaluation and management of c/o increased fatigue, lightheadedness, and nausea upon awakening this morning. As noted the patient noted his blood pressure to be lower than normal => 105mmHg. The patient additionally c/o intermittent episodes of dark stool.  Vital signs in triage => /57, HR 95/min, RR 18/min, TMax 98.2'F, SpO2 90%. Labs => WBC 14.32, MCV/MCH 79.3/24.4, LA 2.1 -> 1.0, BUN/Cr 29/1.04. CT Head => No acute abnormality. Mild periventricular white matter ischemia. Minimal mucosal thickening in bilateral maxillary sinuses. CTA PE Protocol => 1. No pulmonary thromboembolism. 2. New findings in the left lung compatible with multifocal bronchopneumonia. 3. Emphysema. In the ED the patient received Ceftriaxone 1g IVPB x 1, Azithromycin 500mg IVPB x 1, and NS x 2L.    #Multifocal Bronchopneumonia  ~admit to Medicine  ~Reviewed CXR  ~Reviewed CTA Chest PE protocol  ~Reviewed labs as outlined above  ~f/u am labs   ~f/u PAN C+S  ~f/u on sputum culture, Legionella, and S/ pneumo  ~cont. Ceftriaxone 1g IVPB daily + Azithromycin 500mg IVPB daily  ~cont. IV fluids  ~cont. supplemental oxygen     #Anxiety Disorder  ~cont. Escitalopram 10mg po daily    #Primary Biliary Cholangitis  ~cont. Ursodiol 500mg po bid    #GERD  ~cont. Pantoprazole 40mg po daily    #Vte ppx  ~SCDs for now

## 2022-09-02 NOTE — ED PROVIDER NOTE - WR ORDER DATE AND TIME 1
Pt called to cancel 7/26/22 colon/EGD. Pt does not want to reschedule.
Received note from Endo Nurses as well:    \"Patient requests to cancel scheduled colonoscopy/EGD procedure on 7/26/22 with Dr. Santana Riojas at this time. States it is currently not a good time for her to have the procedure due to many things, one of them being that she has recently switched jobs. Patient instructed to call the office for assistance with rescheduling. Patient verbalized understanding and states she already did this weekend and is awaiting call back from the office. Provided patient with office contact information, per request.  Patient canceled from Endo Lab schedule and removed in Epic. Routed to GI clinic office and Dr. Santana Riojas. \"      CANCELLED for:  Colonoscopy [id number] EGD [id number]  Provider Name:  Dr Santana Riojas   Date:  Tuesday, 07/26/2022  Location:  [facility]  Sedation:  MAC  Time:  10:00am     Cancelled from [employer] appt desk. Per note, patient does not want to reschedule at this time. Closing encounter.
02-Sep-2022 13:38

## 2022-09-03 LAB
ALBUMIN SERPL ELPH-MCNC: 2.9 G/DL — LOW (ref 3.3–5)
ALP SERPL-CCNC: 86 U/L — SIGNIFICANT CHANGE UP (ref 40–120)
ALT FLD-CCNC: 16 U/L — SIGNIFICANT CHANGE UP (ref 12–78)
ANION GAP SERPL CALC-SCNC: 3 MMOL/L — LOW (ref 5–17)
AST SERPL-CCNC: 13 U/L — LOW (ref 15–37)
BASOPHILS # BLD AUTO: 0.03 K/UL — SIGNIFICANT CHANGE UP (ref 0–0.2)
BASOPHILS NFR BLD AUTO: 0.4 % — SIGNIFICANT CHANGE UP (ref 0–2)
BILIRUB SERPL-MCNC: 0.5 MG/DL — SIGNIFICANT CHANGE UP (ref 0.2–1.2)
BUN SERPL-MCNC: 19 MG/DL — SIGNIFICANT CHANGE UP (ref 7–23)
CALCIUM SERPL-MCNC: 8.3 MG/DL — LOW (ref 8.5–10.1)
CHLORIDE SERPL-SCNC: 106 MMOL/L — SIGNIFICANT CHANGE UP (ref 96–108)
CO2 SERPL-SCNC: 29 MMOL/L — SIGNIFICANT CHANGE UP (ref 22–31)
CREAT SERPL-MCNC: 0.7 MG/DL — SIGNIFICANT CHANGE UP (ref 0.5–1.3)
CULTURE RESULTS: SIGNIFICANT CHANGE UP
EGFR: 87 ML/MIN/1.73M2 — SIGNIFICANT CHANGE UP
EOSINOPHIL # BLD AUTO: 0.16 K/UL — SIGNIFICANT CHANGE UP (ref 0–0.5)
EOSINOPHIL NFR BLD AUTO: 1.9 % — SIGNIFICANT CHANGE UP (ref 0–6)
GLUCOSE SERPL-MCNC: 97 MG/DL — SIGNIFICANT CHANGE UP (ref 70–99)
HCT VFR BLD CALC: 33.1 % — LOW (ref 34.5–45)
HGB BLD-MCNC: 10.4 G/DL — LOW (ref 11.5–15.5)
IMM GRANULOCYTES NFR BLD AUTO: 0.6 % — SIGNIFICANT CHANGE UP (ref 0–1.5)
LYMPHOCYTES # BLD AUTO: 0.98 K/UL — LOW (ref 1–3.3)
LYMPHOCYTES # BLD AUTO: 11.7 % — LOW (ref 13–44)
MCHC RBC-ENTMCNC: 25.1 PG — LOW (ref 27–34)
MCHC RBC-ENTMCNC: 31.4 GM/DL — LOW (ref 32–36)
MCV RBC AUTO: 79.8 FL — LOW (ref 80–100)
MONOCYTES # BLD AUTO: 0.65 K/UL — SIGNIFICANT CHANGE UP (ref 0–0.9)
MONOCYTES NFR BLD AUTO: 7.8 % — SIGNIFICANT CHANGE UP (ref 2–14)
NEUTROPHILS # BLD AUTO: 6.49 K/UL — SIGNIFICANT CHANGE UP (ref 1.8–7.4)
NEUTROPHILS NFR BLD AUTO: 77.6 % — HIGH (ref 43–77)
PLATELET # BLD AUTO: 135 K/UL — LOW (ref 150–400)
POTASSIUM SERPL-MCNC: 3.8 MMOL/L — SIGNIFICANT CHANGE UP (ref 3.5–5.3)
POTASSIUM SERPL-SCNC: 3.8 MMOL/L — SIGNIFICANT CHANGE UP (ref 3.5–5.3)
PROT SERPL-MCNC: 6.7 GM/DL — SIGNIFICANT CHANGE UP (ref 6–8.3)
RBC # BLD: 4.15 M/UL — SIGNIFICANT CHANGE UP (ref 3.8–5.2)
RBC # FLD: 15.6 % — HIGH (ref 10.3–14.5)
SODIUM SERPL-SCNC: 138 MMOL/L — SIGNIFICANT CHANGE UP (ref 135–145)
SPECIMEN SOURCE: SIGNIFICANT CHANGE UP
WBC # BLD: 8.36 K/UL — SIGNIFICANT CHANGE UP (ref 3.8–10.5)
WBC # FLD AUTO: 8.36 K/UL — SIGNIFICANT CHANGE UP (ref 3.8–10.5)

## 2022-09-03 PROCEDURE — 99233 SBSQ HOSP IP/OBS HIGH 50: CPT

## 2022-09-03 RX ORDER — AZITHROMYCIN 500 MG/1
500 TABLET, FILM COATED ORAL EVERY 24 HOURS
Refills: 0 | Status: DISCONTINUED | OUTPATIENT
Start: 2022-09-04 | End: 2022-09-04

## 2022-09-03 RX ORDER — AZITHROMYCIN 500 MG/1
500 TABLET, FILM COATED ORAL ONCE
Refills: 0 | Status: COMPLETED | OUTPATIENT
Start: 2022-09-03 | End: 2022-09-03

## 2022-09-03 RX ORDER — INFLUENZA VIRUS VACCINE 15; 15; 15; 15 UG/.5ML; UG/.5ML; UG/.5ML; UG/.5ML
0.7 SUSPENSION INTRAMUSCULAR ONCE
Refills: 0 | Status: COMPLETED | OUTPATIENT
Start: 2022-09-03 | End: 2022-09-04

## 2022-09-03 RX ADMIN — AZITHROMYCIN 255 MILLIGRAM(S): 500 TABLET, FILM COATED ORAL at 23:10

## 2022-09-03 RX ADMIN — URSODIOL 500 MILLIGRAM(S): 250 TABLET, FILM COATED ORAL at 20:56

## 2022-09-03 RX ADMIN — PANTOPRAZOLE SODIUM 40 MILLIGRAM(S): 20 TABLET, DELAYED RELEASE ORAL at 06:07

## 2022-09-03 RX ADMIN — URSODIOL 500 MILLIGRAM(S): 250 TABLET, FILM COATED ORAL at 10:44

## 2022-09-03 RX ADMIN — AZITHROMYCIN 500 MILLIGRAM(S): 500 TABLET, FILM COATED ORAL at 00:27

## 2022-09-03 RX ADMIN — CEFTRIAXONE 100 MILLIGRAM(S): 500 INJECTION, POWDER, FOR SOLUTION INTRAMUSCULAR; INTRAVENOUS at 18:46

## 2022-09-03 RX ADMIN — ESCITALOPRAM OXALATE 10 MILLIGRAM(S): 10 TABLET, FILM COATED ORAL at 10:44

## 2022-09-03 NOTE — PROGRESS NOTE ADULT - SUBJECTIVE AND OBJECTIVE BOX
CODE STATUS:  Hospital day #1    chief complaint:     Feels better no SOB    Vital Signs Last 24 Hrs  T(C): 37 (03 Sep 2022 09:18), Max: 37.7 (02 Sep 2022 21:52)  T(F): 98.6 (03 Sep 2022 09:18), Max: 99.8 (02 Sep 2022 21:52)  HR: 83 (03 Sep 2022 09:18) (77 - 90)  BP: 139/62 (03 Sep 2022 09:18) (139/62 - 152/77)  BP(mean): 89 (02 Sep 2022 21:52) (89 - 97)  RR: 18 (03 Sep 2022 09:18) (18 - 18)  SpO2: 94% (03 Sep 2022 09:18) (94% - 97%)    Parameters below as of 03 Sep 2022 09:18  Patient On (Oxygen Delivery Method): room air          Gen not in acute distress  HEENT nc at perrla  Neck supple no JVD  Chest + rhonchi, crackles  CVS s1s2 reg, no m/g/r  Abd soft nt/nd + BS   voiding  Neuro non focal mot str 5/5 all extr  Skin no rash  Musculoskeletal FROM        Labs:                             10.4   8.36  )-----------( 135      ( 03 Sep 2022 08:12 )             33.1   09-03    138  |  106  |  19  ----------------------------<  97  3.8   |  29  |  0.70    Ca    8.3<L>      03 Sep 2022 08:12    TPro  6.7  /  Alb  2.9<L>  /  TBili  0.5  /  DBili  x   /  AST  13<L>  /  ALT  16  /  AlkPhos  86  09-03      < from: CT Angio Chest PE Protocol w/ IV Cont (09.02.22 @ 17:33) >  1.  No pulmonary thromboembolism    2.  New findings in the left lung compatible with multifocal   bronchopneumonia    3.  Emphysema    MEDICATIONS  (STANDING):  azithromycin  IVPB      cefTRIAXone   IVPB 1000 milliGRAM(s) IV Intermittent every 24 hours  escitalopram 10 milliGRAM(s) Oral daily  influenza  Vaccine (HIGH DOSE) 0.7 milliLiter(s) IntraMuscular once  pantoprazole    Tablet 40 milliGRAM(s) Oral before breakfast  ursodiol Tablet 500 milliGRAM(s) Oral two times a day      A/P:      Multifocal Bronchopneumonia  ~f/u PAN C+S  ~f/u on sputum culture, Legionella, and S/ pneumo  ~cont. Ceftriaxone 1g IVPB daily + Azithromycin 500mg IVPB daily      #Anxiety Disorder  ~cont. Escitalopram 10mg po daily    #Primary Biliary Cholangitis  ~cont. Ursodiol 500mg po bid    #GERD  ~cont. Pantoprazole 40mg po daily    #Vte ppx  ~SCDs for now      	      Dispo:

## 2022-09-03 NOTE — PATIENT PROFILE ADULT - FALL HARM RISK - UNIVERSAL INTERVENTIONS
Bed in lowest position, wheels locked, appropriate side rails in place/Call bell, personal items and telephone in reach/Instruct patient to call for assistance before getting out of bed or chair/Non-slip footwear when patient is out of bed/[place] to call system/Physically safe environment - no spills, clutter or unnecessary equipment/Purposeful Proactive Rounding/Room/bathroom lighting operational, light cord in reach

## 2022-09-03 NOTE — PATIENT PROFILE ADULT - HAVE YOU EXPERIENCED VIOLENCE OR A TRAUMATIC EVENT?
Your current Orthopaedic problem we are working together to treat is:  Left 5th metatarsal fracture    Recommend:  1.  Begin heel-toe weight bearing in your custom shoe  2.  Elevate the foot for swelling  3.  Tylenol for pain, up to 4000 mg per day.      It is recommended you schedule a follow-up appointment with Lex DICKSON / Dr Li in 6 weeks.     During the hours of 8:00 am to 5:00 pm Monday through Friday, please contact us at one of the following offices: [facility] office [phone], or [place] office at [phone].    If it is urgent that you speak with someone outside of these hours, our [facility] Call Center will be able to assist you at [phone].    For more information on foot and ankle conditions and treatments, please visit the [employer]'s patient education website (Foot Care MD) at:  [email or web address]    Thank you for choosing [facility] as your Orthopaedic provider!  
no

## 2022-09-04 ENCOUNTER — TRANSCRIPTION ENCOUNTER (OUTPATIENT)
Age: 82
End: 2022-09-04

## 2022-09-04 VITALS
OXYGEN SATURATION: 93 % | TEMPERATURE: 98 F | HEART RATE: 81 BPM | SYSTOLIC BLOOD PRESSURE: 150 MMHG | DIASTOLIC BLOOD PRESSURE: 70 MMHG | RESPIRATION RATE: 18 BRPM

## 2022-09-04 LAB
LEGIONELLA AG UR QL: NEGATIVE — SIGNIFICANT CHANGE UP
S PNEUM AG UR QL: NEGATIVE — SIGNIFICANT CHANGE UP

## 2022-09-04 PROCEDURE — 99239 HOSP IP/OBS DSCHRG MGMT >30: CPT

## 2022-09-04 RX ORDER — CEFUROXIME AXETIL 250 MG
1 TABLET ORAL
Qty: 12 | Refills: 0
Start: 2022-09-04 | End: 2022-09-09

## 2022-09-04 RX ADMIN — URSODIOL 500 MILLIGRAM(S): 250 TABLET, FILM COATED ORAL at 09:18

## 2022-09-04 RX ADMIN — INFLUENZA VIRUS VACCINE 0.7 MILLILITER(S): 15; 15; 15; 15 SUSPENSION INTRAMUSCULAR at 10:37

## 2022-09-04 RX ADMIN — ESCITALOPRAM OXALATE 10 MILLIGRAM(S): 10 TABLET, FILM COATED ORAL at 09:18

## 2022-09-04 RX ADMIN — PANTOPRAZOLE SODIUM 40 MILLIGRAM(S): 20 TABLET, DELAYED RELEASE ORAL at 06:07

## 2022-09-04 NOTE — DISCHARGE NOTE NURSING/CASE MANAGEMENT/SOCIAL WORK - PATIENT PORTAL LINK FT
You can access the FollowMyHealth Patient Portal offered by [facility] by registering at the following website: [email or web address]. By joining [employer]’s FollowMyHealth portal, you will also be able to view your health information using other applications (apps) compatible with our system.

## 2022-09-04 NOTE — DISCHARGE NOTE NURSING/CASE MANAGEMENT/SOCIAL WORK - NSDCVIVACCINE_GEN_ALL_CORE_FT
"Sabra "Sabra" Alecia was seen and treated in our emergency department on 7/2/2022.  She may return to work on 07/04/2022.       If you have any questions or concerns, please don't hesitate to call.      Fili Crowell PA-C"
No Vaccines Administered.

## 2022-09-04 NOTE — DISCHARGE NOTE PROVIDER - HOSPITAL COURSE
Multifocal Bronchopneumonia  ~f/u PAN C+S  ~f/u on sputum culture, Legionella, and S/ pneumo  ~cont. Ceftriaxone 1g IVPB daily + Azithromycin 500mg IVPB daily- dc home on Ceftin       #Anxiety Disorder  ~cont. Escitalopram 10mg po daily    #Primary Biliary Cholangitis  ~cont. Ursodiol 500mg po bid    #GERD  ~cont. Pantoprazole 40mg po daily         CT Angio Chest PE Protocol w/ IV Cont (09.02.22 @ 17:33) >    1.  No pulmonary thromboembolism    2.  New findings in the left lung compatible with multifocal   bronchopneumonia    3.  Emphysema

## 2022-09-04 NOTE — DISCHARGE NOTE NURSING/CASE MANAGEMENT/SOCIAL WORK - NSDCPEFALRISK_GEN_ALL_CORE
For information on Fall & Injury Prevention, visit: [email or web address] OR  [email or web address] OR  https://www.cdc.gov/steadi/patient.html

## 2022-09-04 NOTE — DISCHARGE NOTE PROVIDER - NSDCMRMEDTOKEN_GEN_ALL_CORE_FT
cefuroxime 500 mg oral tablet: 1 tab(s) orally every 12 hours   Drisdol 1.25 mg (50,000 intl units) oral capsule: 1 cap(s) orally once a week  escitalopram 10 mg oral tablet: 1 tab(s) orally once a day  pantoprazole 40 mg oral delayed release tablet: 1 tab(s) orally once a day  PreserVision AREDS oral capsule:   ursodiol 500 mg oral tablet: 1 tab(s) orally 2 times a day

## 2022-09-04 NOTE — DISCHARGE NOTE PROVIDER - NSDCCPCAREPLAN_GEN_ALL_CORE_FT
PRINCIPAL DISCHARGE DIAGNOSIS  Diagnosis: Multifocal pneumonia  Assessment and Plan of Treatment: pls f/up with PCP to ensure resolution of infiltrates; you will need repeat chest xray in a month

## 2022-09-07 LAB
CULTURE RESULTS: SIGNIFICANT CHANGE UP
CULTURE RESULTS: SIGNIFICANT CHANGE UP
SPECIMEN SOURCE: SIGNIFICANT CHANGE UP
SPECIMEN SOURCE: SIGNIFICANT CHANGE UP

## 2022-09-08 DIAGNOSIS — E78.5 HYPERLIPIDEMIA, UNSPECIFIED: ICD-10-CM

## 2022-09-08 DIAGNOSIS — I25.10 ATHEROSCLEROTIC HEART DISEASE OF NATIVE CORONARY ARTERY WITHOUT ANGINA PECTORIS: ICD-10-CM

## 2022-09-08 DIAGNOSIS — Z90.49 ACQUIRED ABSENCE OF OTHER SPECIFIED PARTS OF DIGESTIVE TRACT: ICD-10-CM

## 2022-09-08 DIAGNOSIS — J43.9 EMPHYSEMA, UNSPECIFIED: ICD-10-CM

## 2022-09-08 DIAGNOSIS — K74.3 PRIMARY BILIARY CIRRHOSIS: ICD-10-CM

## 2022-09-08 DIAGNOSIS — Z87.891 PERSONAL HISTORY OF NICOTINE DEPENDENCE: ICD-10-CM

## 2022-09-08 DIAGNOSIS — D68.2 HEREDITARY DEFICIENCY OF OTHER CLOTTING FACTORS: ICD-10-CM

## 2022-09-08 DIAGNOSIS — J18.0 BRONCHOPNEUMONIA, UNSPECIFIED ORGANISM: ICD-10-CM

## 2022-09-08 DIAGNOSIS — D69.3 IMMUNE THROMBOCYTOPENIC PURPURA: ICD-10-CM

## 2022-09-08 DIAGNOSIS — F41.9 ANXIETY DISORDER, UNSPECIFIED: ICD-10-CM

## 2022-09-08 DIAGNOSIS — Z88.2 ALLERGY STATUS TO SULFONAMIDES: ICD-10-CM

## 2022-09-08 DIAGNOSIS — I95.9 HYPOTENSION, UNSPECIFIED: ICD-10-CM

## 2022-09-08 DIAGNOSIS — K21.9 GASTRO-ESOPHAGEAL REFLUX DISEASE WITHOUT ESOPHAGITIS: ICD-10-CM

## 2022-09-08 DIAGNOSIS — Z88.0 ALLERGY STATUS TO PENICILLIN: ICD-10-CM

## 2023-02-02 NOTE — DISCHARGE NOTE PROVIDER - PROVIDER TOKENS
Conjuntivae and eyelids appear normal,  Sclerae : White without injection PROVIDER:[TOKEN:[[id number]],FOLLOWUP:[1 week]]

## 2023-07-13 ENCOUNTER — INPATIENT (INPATIENT)
Facility: HOSPITAL | Age: 83
LOS: 2 days | Discharge: HOME HEALTH | DRG: 194 | End: 2023-07-16
Attending: HOSPITALIST | Admitting: INTERNAL MEDICINE
Payer: MEDICARE

## 2023-07-13 VITALS
SYSTOLIC BLOOD PRESSURE: 115 MMHG | TEMPERATURE: 98 F | DIASTOLIC BLOOD PRESSURE: 55 MMHG | HEIGHT: 66 IN | OXYGEN SATURATION: 88 % | WEIGHT: 229.94 LBS | RESPIRATION RATE: 18 BRPM | HEART RATE: 88 BPM

## 2023-07-13 DIAGNOSIS — Z98.890 OTHER SPECIFIED POSTPROCEDURAL STATES: Chronic | ICD-10-CM

## 2023-07-13 DIAGNOSIS — Z90.49 ACQUIRED ABSENCE OF OTHER SPECIFIED PARTS OF DIGESTIVE TRACT: Chronic | ICD-10-CM

## 2023-07-13 DIAGNOSIS — Z98.49 CATARACT EXTRACTION STATUS, UNSPECIFIED EYE: Chronic | ICD-10-CM

## 2023-07-13 DIAGNOSIS — Z95.818 PRESENCE OF OTHER CARDIAC IMPLANTS AND GRAFTS: Chronic | ICD-10-CM

## 2023-07-13 DIAGNOSIS — J18.9 PNEUMONIA, UNSPECIFIED ORGANISM: ICD-10-CM

## 2023-07-13 LAB
ALBUMIN SERPL ELPH-MCNC: 3.4 G/DL — SIGNIFICANT CHANGE UP (ref 3.3–5)
ALP SERPL-CCNC: 114 U/L — SIGNIFICANT CHANGE UP (ref 40–120)
ALT FLD-CCNC: 19 U/L — SIGNIFICANT CHANGE UP (ref 12–78)
ANION GAP SERPL CALC-SCNC: 4 MMOL/L — LOW (ref 5–17)
APPEARANCE UR: CLEAR — SIGNIFICANT CHANGE UP
AST SERPL-CCNC: 20 U/L — SIGNIFICANT CHANGE UP (ref 15–37)
BACTERIA # UR AUTO: NEGATIVE — SIGNIFICANT CHANGE UP
BASOPHILS # BLD AUTO: 0.04 K/UL — SIGNIFICANT CHANGE UP (ref 0–0.2)
BASOPHILS NFR BLD AUTO: 0.3 % — SIGNIFICANT CHANGE UP (ref 0–2)
BILIRUB SERPL-MCNC: 0.4 MG/DL — SIGNIFICANT CHANGE UP (ref 0.2–1.2)
BILIRUB UR-MCNC: NEGATIVE — SIGNIFICANT CHANGE UP
BUN SERPL-MCNC: 21 MG/DL — SIGNIFICANT CHANGE UP (ref 7–23)
CALCIUM SERPL-MCNC: 8.9 MG/DL — SIGNIFICANT CHANGE UP (ref 8.5–10.1)
CHLORIDE SERPL-SCNC: 105 MMOL/L — SIGNIFICANT CHANGE UP (ref 96–108)
CO2 SERPL-SCNC: 30 MMOL/L — SIGNIFICANT CHANGE UP (ref 22–31)
COLOR SPEC: YELLOW — SIGNIFICANT CHANGE UP
COMMENT - URINE: SIGNIFICANT CHANGE UP
CREAT SERPL-MCNC: 1.08 MG/DL — SIGNIFICANT CHANGE UP (ref 0.5–1.3)
DIFF PNL FLD: ABNORMAL
EGFR: 52 ML/MIN/1.73M2 — LOW
EOSINOPHIL # BLD AUTO: 0.01 K/UL — SIGNIFICANT CHANGE UP (ref 0–0.5)
EOSINOPHIL NFR BLD AUTO: 0.1 % — SIGNIFICANT CHANGE UP (ref 0–6)
EPI CELLS # UR: ABNORMAL
GLUCOSE SERPL-MCNC: 132 MG/DL — HIGH (ref 70–99)
GLUCOSE UR QL: NEGATIVE — SIGNIFICANT CHANGE UP
HCT VFR BLD CALC: 40.1 % — SIGNIFICANT CHANGE UP (ref 34.5–45)
HGB BLD-MCNC: 12.8 G/DL — SIGNIFICANT CHANGE UP (ref 11.5–15.5)
HYALINE CASTS # UR AUTO: ABNORMAL /LPF
IMM GRANULOCYTES NFR BLD AUTO: 0.5 % — SIGNIFICANT CHANGE UP (ref 0–0.9)
KETONES UR-MCNC: NEGATIVE — SIGNIFICANT CHANGE UP
LACTATE SERPL-SCNC: 1.4 MMOL/L — SIGNIFICANT CHANGE UP (ref 0.7–2)
LEUKOCYTE ESTERASE UR-ACNC: NEGATIVE — SIGNIFICANT CHANGE UP
LYMPHOCYTES # BLD AUTO: 0.43 K/UL — LOW (ref 1–3.3)
LYMPHOCYTES # BLD AUTO: 3.1 % — LOW (ref 13–44)
MAGNESIUM SERPL-MCNC: 1.8 MG/DL — SIGNIFICANT CHANGE UP (ref 1.6–2.6)
MANUAL SMEAR VERIFICATION: SIGNIFICANT CHANGE UP
MCHC RBC-ENTMCNC: 25.3 PG — LOW (ref 27–34)
MCHC RBC-ENTMCNC: 31.9 GM/DL — LOW (ref 32–36)
MCV RBC AUTO: 79.4 FL — LOW (ref 80–100)
MONOCYTES # BLD AUTO: 0.54 K/UL — SIGNIFICANT CHANGE UP (ref 0–0.9)
MONOCYTES NFR BLD AUTO: 3.9 % — SIGNIFICANT CHANGE UP (ref 2–14)
NEUTROPHILS # BLD AUTO: 12.59 K/UL — HIGH (ref 1.8–7.4)
NEUTROPHILS NFR BLD AUTO: 92.1 % — HIGH (ref 43–77)
NITRITE UR-MCNC: NEGATIVE — SIGNIFICANT CHANGE UP
PH UR: 5 — SIGNIFICANT CHANGE UP (ref 5–8)
PLAT MORPH BLD: NORMAL — SIGNIFICANT CHANGE UP
PLATELET # BLD AUTO: 188 K/UL — SIGNIFICANT CHANGE UP (ref 150–400)
POTASSIUM SERPL-MCNC: 4.7 MMOL/L — SIGNIFICANT CHANGE UP (ref 3.5–5.3)
POTASSIUM SERPL-SCNC: 4.7 MMOL/L — SIGNIFICANT CHANGE UP (ref 3.5–5.3)
PROT SERPL-MCNC: 7.9 GM/DL — SIGNIFICANT CHANGE UP (ref 6–8.3)
PROT UR-MCNC: 30 MG/DL
RAPID RVP RESULT: SIGNIFICANT CHANGE UP
RBC # BLD: 5.05 M/UL — SIGNIFICANT CHANGE UP (ref 3.8–5.2)
RBC # FLD: 15.1 % — HIGH (ref 10.3–14.5)
RBC BLD AUTO: NORMAL — SIGNIFICANT CHANGE UP
RBC CASTS # UR COMP ASSIST: ABNORMAL /HPF (ref 0–4)
SARS-COV-2 RNA SPEC QL NAA+PROBE: SIGNIFICANT CHANGE UP
SODIUM SERPL-SCNC: 139 MMOL/L — SIGNIFICANT CHANGE UP (ref 135–145)
SP GR SPEC: 1.01 — SIGNIFICANT CHANGE UP (ref 1.01–1.02)
TROPONIN I, HIGH SENSITIVITY RESULT: 32.4 NG/L — SIGNIFICANT CHANGE UP
UROBILINOGEN FLD QL: NEGATIVE — SIGNIFICANT CHANGE UP
WBC # BLD: 13.68 K/UL — HIGH (ref 3.8–10.5)
WBC # FLD AUTO: 13.68 K/UL — HIGH (ref 3.8–10.5)
WBC UR QL: SIGNIFICANT CHANGE UP /HPF (ref 0–5)

## 2023-07-13 PROCEDURE — 97162 PT EVAL MOD COMPLEX 30 MIN: CPT | Mod: GP

## 2023-07-13 PROCEDURE — 99223 1ST HOSP IP/OBS HIGH 75: CPT

## 2023-07-13 PROCEDURE — 0225U NFCT DS DNA&RNA 21 SARSCOV2: CPT

## 2023-07-13 PROCEDURE — 99285 EMERGENCY DEPT VISIT HI MDM: CPT

## 2023-07-13 PROCEDURE — 92523 SPEECH SOUND LANG COMPREHEN: CPT | Mod: GN

## 2023-07-13 PROCEDURE — 72125 CT NECK SPINE W/O DYE: CPT | Mod: 26,MA

## 2023-07-13 PROCEDURE — 71250 CT THORAX DX C-: CPT | Mod: 26

## 2023-07-13 PROCEDURE — 70450 CT HEAD/BRAIN W/O DYE: CPT | Mod: 26,MA

## 2023-07-13 PROCEDURE — 71250 CT THORAX DX C-: CPT

## 2023-07-13 PROCEDURE — 71045 X-RAY EXAM CHEST 1 VIEW: CPT | Mod: 26

## 2023-07-13 PROCEDURE — 97530 THERAPEUTIC ACTIVITIES: CPT | Mod: GP

## 2023-07-13 PROCEDURE — 80048 BASIC METABOLIC PNL TOTAL CA: CPT

## 2023-07-13 PROCEDURE — 36415 COLL VENOUS BLD VENIPUNCTURE: CPT

## 2023-07-13 PROCEDURE — 87040 BLOOD CULTURE FOR BACTERIA: CPT

## 2023-07-13 PROCEDURE — 97116 GAIT TRAINING THERAPY: CPT | Mod: GP

## 2023-07-13 PROCEDURE — 83605 ASSAY OF LACTIC ACID: CPT

## 2023-07-13 PROCEDURE — 92610 EVALUATE SWALLOWING FUNCTION: CPT | Mod: GN

## 2023-07-13 PROCEDURE — 85027 COMPLETE CBC AUTOMATED: CPT

## 2023-07-13 PROCEDURE — 93005 ELECTROCARDIOGRAM TRACING: CPT

## 2023-07-13 RX ORDER — SODIUM CHLORIDE 9 MG/ML
1000 INJECTION INTRAMUSCULAR; INTRAVENOUS; SUBCUTANEOUS ONCE
Refills: 0 | Status: COMPLETED | OUTPATIENT
Start: 2023-07-13 | End: 2023-07-13

## 2023-07-13 RX ORDER — ACETAMINOPHEN 500 MG
650 TABLET ORAL EVERY 6 HOURS
Refills: 0 | Status: DISCONTINUED | OUTPATIENT
Start: 2023-07-13 | End: 2023-07-16

## 2023-07-13 RX ORDER — ONDANSETRON 8 MG/1
4 TABLET, FILM COATED ORAL EVERY 8 HOURS
Refills: 0 | Status: DISCONTINUED | OUTPATIENT
Start: 2023-07-13 | End: 2023-07-16

## 2023-07-13 RX ORDER — URSODIOL 250 MG/1
1 TABLET, FILM COATED ORAL
Qty: 0 | Refills: 0 | DISCHARGE

## 2023-07-13 RX ORDER — AZITHROMYCIN 500 MG/1
500 TABLET, FILM COATED ORAL ONCE
Refills: 0 | Status: COMPLETED | OUTPATIENT
Start: 2023-07-13 | End: 2023-07-13

## 2023-07-13 RX ORDER — ENOXAPARIN SODIUM 100 MG/ML
40 INJECTION SUBCUTANEOUS EVERY 24 HOURS
Refills: 0 | Status: DISCONTINUED | OUTPATIENT
Start: 2023-07-13 | End: 2023-07-16

## 2023-07-13 RX ORDER — AZITHROMYCIN 500 MG/1
500 TABLET, FILM COATED ORAL EVERY 24 HOURS
Refills: 0 | Status: DISCONTINUED | OUTPATIENT
Start: 2023-07-13 | End: 2023-07-16

## 2023-07-13 RX ORDER — ESCITALOPRAM OXALATE 10 MG/1
1 TABLET, FILM COATED ORAL
Qty: 0 | Refills: 0 | DISCHARGE

## 2023-07-13 RX ORDER — CEFTRIAXONE 500 MG/1
1000 INJECTION, POWDER, FOR SOLUTION INTRAMUSCULAR; INTRAVENOUS EVERY 24 HOURS
Refills: 0 | Status: DISCONTINUED | OUTPATIENT
Start: 2023-07-13 | End: 2023-07-13

## 2023-07-13 RX ORDER — CEFTRIAXONE 500 MG/1
1000 INJECTION, POWDER, FOR SOLUTION INTRAMUSCULAR; INTRAVENOUS EVERY 24 HOURS
Refills: 0 | Status: DISCONTINUED | OUTPATIENT
Start: 2023-07-13 | End: 2023-07-16

## 2023-07-13 RX ORDER — LANOLIN ALCOHOL/MO/W.PET/CERES
3 CREAM (GRAM) TOPICAL AT BEDTIME
Refills: 0 | Status: DISCONTINUED | OUTPATIENT
Start: 2023-07-13 | End: 2023-07-16

## 2023-07-13 RX ORDER — PANTOPRAZOLE SODIUM 20 MG/1
1 TABLET, DELAYED RELEASE ORAL
Qty: 0 | Refills: 0 | DISCHARGE

## 2023-07-13 RX ORDER — ERGOCALCIFEROL 1.25 MG/1
1 CAPSULE ORAL
Qty: 0 | Refills: 0 | DISCHARGE

## 2023-07-13 RX ORDER — MULTIVIT-MIN/FERROUS GLUCONATE 9 MG/15 ML
0 LIQUID (ML) ORAL
Qty: 0 | Refills: 0 | DISCHARGE

## 2023-07-13 RX ORDER — CEFTRIAXONE 500 MG/1
1000 INJECTION, POWDER, FOR SOLUTION INTRAMUSCULAR; INTRAVENOUS ONCE
Refills: 0 | Status: COMPLETED | OUTPATIENT
Start: 2023-07-13 | End: 2023-07-13

## 2023-07-13 RX ADMIN — CEFTRIAXONE 1000 MILLIGRAM(S): 500 INJECTION, POWDER, FOR SOLUTION INTRAMUSCULAR; INTRAVENOUS at 15:38

## 2023-07-13 RX ADMIN — SODIUM CHLORIDE 1000 MILLILITER(S): 9 INJECTION INTRAMUSCULAR; INTRAVENOUS; SUBCUTANEOUS at 15:38

## 2023-07-13 RX ADMIN — ENOXAPARIN SODIUM 40 MILLIGRAM(S): 100 INJECTION SUBCUTANEOUS at 21:11

## 2023-07-13 RX ADMIN — AZITHROMYCIN 255 MILLIGRAM(S): 500 TABLET, FILM COATED ORAL at 15:39

## 2023-07-13 NOTE — H&P ADULT - ASSESSMENT
* Weakness fall ? LOC  remote tele  PT eval  CT chest doubt PNA  pt received abx in ed    * emphysema, ITP, mild emphysema, osteoarthritis, primary biliary cholangitis   resume home meds     * Weakness fall ? LOC CAP  remote tele  PT eval  CT chest   Ceftriaxone Zithromax  pt received abx in ed    * emphysema, ITP, mild emphysema, osteoarthritis, primary biliary cholangitis   resume home meds

## 2023-07-13 NOTE — ED PROVIDER NOTE - CLINICAL SUMMARY MEDICAL DECISION MAKING FREE TEXT BOX
pt with generalized waeakness this morning, got up to go to the bathroom and was so weak had to crawl back to bed, will get labs, ua and xray chest r/o pna, uti, hydrate

## 2023-07-13 NOTE — ED ADULT TRIAGE NOTE - NS ED NURSE AMBULANCES
[facility]

## 2023-07-13 NOTE — ED ADULT NURSE NOTE - OBJECTIVE STATEMENT
Pt. presented to the ED s/p fall at home. Per pt., this morning she felt light headed, weak and fell in the bathroom, +head strike, no LOC, not on blood thinner. Pt. also mentioned that she has a hx of acid reflux and felt some discomfort on her chest this morning but already resolved on its own.  Denies CP, SOB and dizziness at this time.

## 2023-07-13 NOTE — ED ADULT NURSE NOTE - NSFALLHARMRISKINTERV_ED_ALL_ED

## 2023-07-13 NOTE — H&P ADULT - NSHPPHYSICALEXAM_GEN_ALL_CORE
Vital Signs Last 24 Hrs  T(C): 36.5 (13 Jul 2023 12:33), Max: 36.5 (13 Jul 2023 12:33)  T(F): 97.7 (13 Jul 2023 12:33), Max: 97.7 (13 Jul 2023 12:33)  HR: 88 (13 Jul 2023 12:33) (88 - 88)  BP: 115/55 (13 Jul 2023 12:33) (115/55 - 115/55)  BP(mean): --  RR: 18 (13 Jul 2023 12:33) (18 - 18)  SpO2: 88% (13 Jul 2023 12:33) (88% - 88%)    Parameters below as of 13 Jul 2023 12:33  Patient On (Oxygen Delivery Method): room air    PHYSICAL EXAM:      Constitutional: NAD  Eyes: perrl, no conjunctival changes  ENMT: no exudates, moist oral muc, uvula midline  Neck: no JVD, no LAD  Back: no cva tenderness  Respiratory: CTA, no exp wheezes  Cardiovascular: S1S2 reg, no murmur gallop or rub  Gastrointestinal: abd soft, NT/ND + BS  Genitourinary: voiding  Extremities: FROM, no joint effusions, no edema, no clubbing , no cyanosis  Vascular: pedal pulses + bilateral, warm extremities  Neurological: non focal, mot str 5/5/ all extr  Skin: no rashes  Lymph Nodes: no LAD      BUCKY- absent B lines

## 2023-07-13 NOTE — H&P ADULT - HISTORY OF PRESENT ILLNESS
82 y/o female with a PMHx of acid reflux, anxiety, emphysema, ITP, mild emphysema, osteoarthritis, primary biliary cholangitis, pulmonary nodule, pure hypercholesterolemia, spinal stenosis presents to the ED BIBA from home for evaluation. Pt reports she woke up with generalized weakness, knew she could not get to the bathroom by walking then crawled on her hands and knees. Pt called back to bed and called EMS. No other complaints at this time. CXR-  poss PNA. Adm for remote tele, I dont think she has PNA; lives alone   82 y/o female with a PMHx of acid reflux, anxiety, emphysema, ITP, mild emphysema, osteoarthritis, primary biliary cholangitis, pulmonary nodule, pure hypercholesterolemia, spinal stenosis presents to the ED BIBA from home for evaluation. Pt reports she woke up with generalized weakness, knew she could not get to the bathroom by walking then crawled on her hands and knees. Pt called back to bed and called EMS. No other complaints at this time. CXR-  poss PNA. Adm for remote tele, PNA

## 2023-07-13 NOTE — H&P ADULT - NSHPLABSRESULTS_GEN_ALL_CORE
12.8   13.68 )-----------( 188      ( 13 Jul 2023 13:21 )             40.1   07-13    139  |  105  |  21  ----------------------------<  132<H>  4.7   |  30  |  1.08    Ca    8.9      13 Jul 2023 13:21  Mg     1.8     07-13    TPro  7.9  /  Alb  3.4  /  TBili  0.4  /  DBili  x   /  AST  20  /  ALT  19  /  AlkPhos  114  07-13      EKG NSR

## 2023-07-13 NOTE — ED PROVIDER NOTE - OBJECTIVE STATEMENT
84 y/o female with a PMHx of acid reflux, anxiety, emphysema, ITP, mild emphysema, osteoarthritis, primary biliary cholangitis, pulmonary nodule, pure hypercholesterolemia, spinal stenosis presents to the ED BIBA from home for evaluation. Pt reports she woke up with generalized weakness, knew she could not get to the bathroom by walking then crawled on her hands and knees. Pt called back to bed and called EMS. No other complaints at this time.

## 2023-07-13 NOTE — ED ADULT TRIAGE NOTE - CHIEF COMPLAINT QUOTE
BIBEMS s/p unwitnessed fall in bathroom. + head strike, denies LOC, not on anticoagulant therapy. pt reports she was lightheaded prior to falling, also c/o fatigue. states she was experiencing mid sternal chest pain which has self-resolved. NA activated from triage, pt taken directly to cat scan

## 2023-07-13 NOTE — ED ADULT NURSE NOTE - HOW OFTEN DO YOU HAVE A DRINK CONTAINING ALCOHOL?
For information on Fall & Injury Prevention, visit: [email or web address] OR  [email or web address] OR  https://www.cdc.gov/steadi/patient.html
Monthly or less

## 2023-07-14 LAB
ANION GAP SERPL CALC-SCNC: 3 MMOL/L — LOW (ref 5–17)
BUN SERPL-MCNC: 22 MG/DL — SIGNIFICANT CHANGE UP (ref 7–23)
CALCIUM SERPL-MCNC: 8.8 MG/DL — SIGNIFICANT CHANGE UP (ref 8.5–10.1)
CHLORIDE SERPL-SCNC: 107 MMOL/L — SIGNIFICANT CHANGE UP (ref 96–108)
CO2 SERPL-SCNC: 29 MMOL/L — SIGNIFICANT CHANGE UP (ref 22–31)
CREAT SERPL-MCNC: 0.81 MG/DL — SIGNIFICANT CHANGE UP (ref 0.5–1.3)
EGFR: 73 ML/MIN/1.73M2 — SIGNIFICANT CHANGE UP
GLUCOSE SERPL-MCNC: 91 MG/DL — SIGNIFICANT CHANGE UP (ref 70–99)
HCT VFR BLD CALC: 35.4 % — SIGNIFICANT CHANGE UP (ref 34.5–45)
HGB BLD-MCNC: 11 G/DL — LOW (ref 11.5–15.5)
MCHC RBC-ENTMCNC: 24.9 PG — LOW (ref 27–34)
MCHC RBC-ENTMCNC: 31.1 GM/DL — LOW (ref 32–36)
MCV RBC AUTO: 80.3 FL — SIGNIFICANT CHANGE UP (ref 80–100)
PLATELET # BLD AUTO: 153 K/UL — SIGNIFICANT CHANGE UP (ref 150–400)
POTASSIUM SERPL-MCNC: 4.3 MMOL/L — SIGNIFICANT CHANGE UP (ref 3.5–5.3)
POTASSIUM SERPL-SCNC: 4.3 MMOL/L — SIGNIFICANT CHANGE UP (ref 3.5–5.3)
RBC # BLD: 4.41 M/UL — SIGNIFICANT CHANGE UP (ref 3.8–5.2)
RBC # FLD: 15 % — HIGH (ref 10.3–14.5)
SODIUM SERPL-SCNC: 139 MMOL/L — SIGNIFICANT CHANGE UP (ref 135–145)
WBC # BLD: 7.82 K/UL — SIGNIFICANT CHANGE UP (ref 3.8–10.5)
WBC # FLD AUTO: 7.82 K/UL — SIGNIFICANT CHANGE UP (ref 3.8–10.5)

## 2023-07-14 PROCEDURE — 99233 SBSQ HOSP IP/OBS HIGH 50: CPT

## 2023-07-14 RX ORDER — PANTOPRAZOLE SODIUM 20 MG/1
40 TABLET, DELAYED RELEASE ORAL
Refills: 0 | Status: DISCONTINUED | OUTPATIENT
Start: 2023-07-14 | End: 2023-07-16

## 2023-07-14 RX ORDER — FAMOTIDINE 10 MG/ML
20 INJECTION INTRAVENOUS DAILY
Refills: 0 | Status: DISCONTINUED | OUTPATIENT
Start: 2023-07-14 | End: 2023-07-16

## 2023-07-14 RX ADMIN — AZITHROMYCIN 255 MILLIGRAM(S): 500 TABLET, FILM COATED ORAL at 15:16

## 2023-07-14 RX ADMIN — CEFTRIAXONE 1000 MILLIGRAM(S): 500 INJECTION, POWDER, FOR SOLUTION INTRAMUSCULAR; INTRAVENOUS at 15:14

## 2023-07-14 RX ADMIN — ENOXAPARIN SODIUM 40 MILLIGRAM(S): 100 INJECTION SUBCUTANEOUS at 21:27

## 2023-07-14 RX ADMIN — FAMOTIDINE 20 MILLIGRAM(S): 10 INJECTION INTRAVENOUS at 10:55

## 2023-07-14 NOTE — PHYSICAL THERAPY INITIAL EVALUATION ADULT - PERTINENT HX OF CURRENT PROBLEM, REHAB EVAL
84 y/o female with a PMHx of acid reflux, anxiety, emphysema, ITP, mild emphysema, osteoarthritis, primary biliary cholangitis, pulmonary nodule, pure hypercholesterolemia, spinal stenosis presents to the ED BIBA from home for evaluation 2/2 weakness.    CT CHEST: Bilateral clusters of nodular opacities suggestive of pneumonia with endobronchial spread. A 1-3 month follow-up noncontrast chest CT is recommended to ensure resolution.; CT HEAD/C-SPINE: No acute intracranial hemorrhage No acute fracture cervical spine. If pain persists, follow-up MRI exam recommended (if no contraindication). Incompletely visualized age-indeterminate compression deformity T3 vertebral body. Correlate clinically with site of pain/injury. Dedicated thoracic imaging could be obtained as clinically warranted.

## 2023-07-14 NOTE — SWALLOW BEDSIDE ASSESSMENT ADULT - COMMENTS
The pt was admitted to  with a cough and generalized weakness. She was diagnosed with pneumonia. She has an underlying h/o GERD which she stated is severe. Other prior medical history is remarkable for emphysema, ITP, anxiety, OA, primary biliary cholangitis, presence of a pulmonary nodule, HLD, thrombocytopenia, spinal stenosis, past choley and previous cataract extractions.

## 2023-07-14 NOTE — PROGRESS NOTE ADULT - ASSESSMENT
82 y/o female with a PMHx of acid reflux, anxiety, emphysema, ITP, mild emphysema, osteoarthritis, primary biliary cholangitis, pulmonary nodule, pure hypercholesterolemia, spinal stenosis presents to the ED BIBA from home for evaluation. Pt reports she woke up with generalized weakness, knew she could not get to the bathroom by walking then crawled on her hands and knees. Pt called back to bed and called EMS. No other complaints at this time. CXR-  poss PNA. Adm for remote tele, PNA    # CAP  - Started on azithromycin and Rocephin  - Check sputum culture  - Follow up blood cultures  - 02 via nc   - Check strep and legionella  - Swallow eval for recurrent pna, ? Aspiration.    # GERD  - Start protonix  - Start famotidine    # LOC  - Patient denying LOC    # DVT Proph  - Lovenox    # Dispo: Full code

## 2023-07-14 NOTE — PHYSICAL THERAPY INITIAL EVALUATION ADULT - CRITERIA FOR SKILLED THERAPEUTIC INTERVENTIONS
pt with no acute care PT needs, would benefit from Home PT upon DC./impairments found/functional limitations in following categories

## 2023-07-14 NOTE — SWALLOW BEDSIDE ASSESSMENT ADULT - ADDITIONAL RECOMMENDATIONS
1) Hospitalist follow up. Pt exhibited functional Oropharyngeal Swallowing integrity for age. Bolus formation/transfer were mechanically functional for age without an oral motor focality, swallow was triggered in an acceptable time frame for age and laryngeal lift on palpation during swallow trials was felt to be functional for age as well. NO prandial behavioral aspiration signs exhibited on exam. No change in O2 sats noted. Odynophagia and Dyspepsia were denied. This profile is superimposed upon a history of known GERD which she described as severe. Note that pt's h/o episodic post prandial aspiration from reflux is relatively heightened, particularly nocturnally, given known history of GERD. The need to consult GI regarding pt's GERD is at discretion of hospitalist.     2) The patient's bed should be postured at somewhat of an upright angle when he is lying down due to known GERD which is apt to heighten at night/when pt is lying down. Pt did deny Dyspepsia on my exam though she did describe her GERD as severe.

## 2023-07-14 NOTE — SWALLOW BEDSIDE ASSESSMENT ADULT - SWALLOW EVAL: CRITERIA FOR SKILLED INTERVENTION MET
DO NOT FEEL THAT ACUTE SPEECH PATHOLOGY FOLLOW UP WOULD CHANGE CLINICAL MANAGEMENT/OUTCOME IN HOSPITAL SETTING. PT'S OROPHARYNGEAL SWALLOWING INTEGRITY AND SPEECH-LANGUAGE INTEGRITY WERE FELT TO BE FUNCTIONAL/AT USUAL STATE/MAXIMIZED. GIVEN ABOVE, THIS SERVICE WILL NOT ACTIVELY FOLLOW IN HOUSE. RECONSULT PRN SHOULD STATUS CHANGE AND CONDITION WARRANT.

## 2023-07-14 NOTE — SWALLOW BEDSIDE ASSESSMENT ADULT - SWALLOW EVAL: DIAGNOSIS
1) Pt exhibits functional Oropharyngeal Swallowing integrity for age. Bolus formation/transfer were mechanically functional for age without an oral motor focality, swallow was triggered in an acceptable time frame for age and laryngeal lift on palpation during swallow trials was felt to be functional for age as well. NO behavioral aspiration signs exhibited. No change in O2 sats noted. Odynophagia and Dyspepsia were denied. This profile is superimposed upon a history of known GERD which she described as severe. Note that pt's h/o episodic post prandial aspiration from reflux is relatively heightened, particularly nocturnally, given known history of GERD.

## 2023-07-14 NOTE — SWALLOW BEDSIDE ASSESSMENT ADULT - NS SPL SWALLOW CLINIC TRIAL FT
Pt exhibited functional Oropharyngeal Swallowing integrity for age. Bolus formation/transfer were mechanically functional for age without an oral motor focality, swallow was triggered in an acceptable time frame for age and laryngeal lift on palpation during swallow trials was felt to be functional for age as well. NO behavioral aspiration signs exhibited. No change in O2 sats noted. Odynophagia and Dyspepsia were denied. This profile is superimposed upon a history of known GERD which she described as severe. Note that pt's h/o episodic post prandial aspiration from reflux is relatively heightened, particularly nocturnally, given known history of GERD.

## 2023-07-14 NOTE — SWALLOW BEDSIDE ASSESSMENT ADULT - ASR SWALLOW RECOMMEND DIAG
DEFER MBS AS PT APPEARED CLINICALLY TOLERANT OF SUGGESTED PO TEXTURES FROM AN OROPHARYNGEAL SWALLOWING STANCE ON EXAM AND NO PRANDIAL BEHAVIORAL ASPIRATION SIGNS EXHIBITED ON EXAM. NO CHANGE IN O2 SATS NOTED ON EXAM.

## 2023-07-14 NOTE — SWALLOW BEDSIDE ASSESSMENT ADULT - SWALLOW EVAL: PROGNOSIS
2) Pt is alert and interactive. Her receptive language abilities were felt to be functional and she was able to verbalize during communicative probes without evidence of a primary motor speech or primary linguistic pathology. Pt is communicatively competent and at reported communicative baseline.

## 2023-07-14 NOTE — SWALLOW BEDSIDE ASSESSMENT ADULT - SLP GENERAL OBSERVATIONS
Pt is alert and interactive. Her receptive language abilities were felt to be functional and she was able to verbalize during communicative probes without evidence of a primary motor speech or primary linguistic pathology. Pt is communicatively competent and at reported communicative baseline.

## 2023-07-15 LAB
ANION GAP SERPL CALC-SCNC: 3 MMOL/L — LOW (ref 5–17)
BUN SERPL-MCNC: 21 MG/DL — SIGNIFICANT CHANGE UP (ref 7–23)
CALCIUM SERPL-MCNC: 9.1 MG/DL — SIGNIFICANT CHANGE UP (ref 8.5–10.1)
CHLORIDE SERPL-SCNC: 106 MMOL/L — SIGNIFICANT CHANGE UP (ref 96–108)
CO2 SERPL-SCNC: 29 MMOL/L — SIGNIFICANT CHANGE UP (ref 22–31)
CREAT SERPL-MCNC: 0.88 MG/DL — SIGNIFICANT CHANGE UP (ref 0.5–1.3)
EGFR: 66 ML/MIN/1.73M2 — SIGNIFICANT CHANGE UP
GLUCOSE SERPL-MCNC: 115 MG/DL — HIGH (ref 70–99)
HCT VFR BLD CALC: 37.3 % — SIGNIFICANT CHANGE UP (ref 34.5–45)
HGB BLD-MCNC: 11.9 G/DL — SIGNIFICANT CHANGE UP (ref 11.5–15.5)
MCHC RBC-ENTMCNC: 25.3 PG — LOW (ref 27–34)
MCHC RBC-ENTMCNC: 31.9 GM/DL — LOW (ref 32–36)
MCV RBC AUTO: 79.4 FL — LOW (ref 80–100)
PLATELET # BLD AUTO: 184 K/UL — SIGNIFICANT CHANGE UP (ref 150–400)
POTASSIUM SERPL-MCNC: 4.2 MMOL/L — SIGNIFICANT CHANGE UP (ref 3.5–5.3)
POTASSIUM SERPL-SCNC: 4.2 MMOL/L — SIGNIFICANT CHANGE UP (ref 3.5–5.3)
RBC # BLD: 4.7 M/UL — SIGNIFICANT CHANGE UP (ref 3.8–5.2)
RBC # FLD: 14.8 % — HIGH (ref 10.3–14.5)
SODIUM SERPL-SCNC: 138 MMOL/L — SIGNIFICANT CHANGE UP (ref 135–145)
WBC # BLD: 6.22 K/UL — SIGNIFICANT CHANGE UP (ref 3.8–10.5)
WBC # FLD AUTO: 6.22 K/UL — SIGNIFICANT CHANGE UP (ref 3.8–10.5)

## 2023-07-15 PROCEDURE — 99232 SBSQ HOSP IP/OBS MODERATE 35: CPT

## 2023-07-15 RX ADMIN — CEFTRIAXONE 1000 MILLIGRAM(S): 500 INJECTION, POWDER, FOR SOLUTION INTRAMUSCULAR; INTRAVENOUS at 17:35

## 2023-07-15 RX ADMIN — FAMOTIDINE 20 MILLIGRAM(S): 10 INJECTION INTRAVENOUS at 09:42

## 2023-07-15 RX ADMIN — AZITHROMYCIN 255 MILLIGRAM(S): 500 TABLET, FILM COATED ORAL at 17:35

## 2023-07-15 RX ADMIN — ENOXAPARIN SODIUM 40 MILLIGRAM(S): 100 INJECTION SUBCUTANEOUS at 22:05

## 2023-07-15 RX ADMIN — PANTOPRAZOLE SODIUM 40 MILLIGRAM(S): 20 TABLET, DELAYED RELEASE ORAL at 05:34

## 2023-07-15 NOTE — PROGRESS NOTE ADULT - SUBJECTIVE AND OBJECTIVE BOX
HPI:  82 y/o female with a PMHx of acid reflux, anxiety, emphysema, ITP, mild emphysema, osteoarthritis, primary biliary cholangitis, pulmonary nodule, pure hypercholesterolemia, spinal stenosis presents to the ED BIBA from home for evaluation. Pt reports she woke up with generalized weakness, knew she could not get to the bathroom by walking then crawled on her hands and knees. Pt called back to bed and called EMS. No other complaints at this time. CXR-  poss PNA. Adm for remote tele, PNA (13 Jul 2023 15:08)      Review of Systems:  CONSTITUTIONAL: No weakness, fevers or chills  EYES/ENT: No visual changes;  No vertigo or throat pain   NECK: No pain or stiffness  RESPIRATORY: No cough, wheezing, hemoptysis; No shortness of breath,   CARDIOVASCULAR: No chest pain or palpitations  GASTROINTESTINAL: No abdominal or epigastric pain. No nausea, vomiting, or hematemesis; No diarrhea or constipation.   GENITOURINARY: No dysuria, frequency or hematuria  NEUROLOGICAL: No numbness or weakness  SKIN: No itching, burning, rashes, or lesions   All other review of systems is negative unless indicated above    PHYSICAL EXAM:    Vital Signs Last 24 Hrs  T(C): 36.4 (15 Jul 2023 08:16), Max: 36.7 (14 Jul 2023 21:09)  T(F): 97.6 (15 Jul 2023 08:16), Max: 98.1 (14 Jul 2023 21:09)  HR: 82 (15 Jul 2023 08:16) (74 - 82)  BP: 141/63 (15 Jul 2023 08:16) (127/49 - 155/68)  BP(mean): --  RR: 17 (15 Jul 2023 08:16) (17 - 18)  SpO2: 93% (15 Jul 2023 08:16) (93% - 95%)    Parameters below as of 15 Jul 2023 08:16  Patient On (Oxygen Delivery Method): room air        GENERAL: comfortable   HEAD:  Atraumatic, Normocephalic  EYES: EOMI, PERRLA, conjunctiva and sclera clear  HEENT: Moist mucous membranes  NECK: Supple, No JVD  NERVOUS SYSTEM:  Alert & Oriented X3, Motor Strength 5/5 B/L upper and lower extremities; DTRs 2+ intact and symmetric  CHEST/LUNG: crackles noted   HEART:S1S2 normal, no murmer  ABDOMEN: Soft, Nontender, Nondistended; Bowel sounds present  GENITOURINARY- Voiding, no palpable bladder  EXTREMITIES:  2+ Peripheral Pulses, No clubbing, cyanosis, or edema  MUSCULOSKELTAL- No muscle tenderness, Muscle tone normal, No joint tenderness, no Joint swelling, Joint range of motion-normal  SKIN-no rash, no lesion  PSYCH- Mood stable  LYMPH Node- No palpable lymph node    LABS:                        11.9   6.22  )-----------( 184      ( 15 Jul 2023 08:25 )             37.3     07-15    138  |  106  |  21  ----------------------------<  115<H>  4.2   |  29  |  0.88    Ca    9.1      15 Jul 2023 08:25        Urinalysis Basic - ( 15 Jul 2023 08:25 )    Color: x / Appearance: x / SG: x / pH: x  Gluc: 115 mg/dL / Ketone: x  / Bili: x / Urobili: x   Blood: x / Protein: x / Nitrite: x   Leuk Esterase: x / RBC: x / WBC x   Sq Epi: x / Non Sq Epi: x / Bacteria: x        CAPILLARY BLOOD GLUCOSE                Standing medicine  acetaminophen     Tablet .. 650 milliGRAM(s) Oral every 6 hours PRN  aluminum hydroxide/magnesium hydroxide/simethicone Suspension 30 milliLiter(s) Oral every 4 hours PRN  azithromycin  IVPB 500 milliGRAM(s) IV Intermittent every 24 hours  cefTRIAXone Injectable. 1000 milliGRAM(s) IV Push every 24 hours  enoxaparin Injectable 40 milliGRAM(s) SubCutaneous every 24 hours  famotidine    Tablet 20 milliGRAM(s) Oral daily  melatonin 3 milliGRAM(s) Oral at bedtime PRN  ondansetron Injectable 4 milliGRAM(s) IV Push every 8 hours PRN  pantoprazole    Tablet 40 milliGRAM(s) Oral before breakfast      A/P    # CAP  - Started on azithromycin and Rocephin  - Check sputum culture  - Follow up blood cultures  - 02 via nc   - Check strep and legionella  - Swallow eval for recurrent pna, ? Aspiration.    # GERD  - Start protonix  - Start famotidine    # LOC  - Patient denying LOC    # DVT Proph  - Lovenox    # Dispo:  d/c plan for tomorrow   
HOSPITALIST ATTENDING PROGRESS NOTE    Chart and meds reviewed.  Patient seen and examined.    CC: Cough    Subjective: Patient feels well, no fever. Some cough.     All other systems reviewed and found to be negative with the exception of what has been described above.    MEDICATIONS  (STANDING):  azithromycin  IVPB 500 milliGRAM(s) IV Intermittent every 24 hours  cefTRIAXone Injectable. 1000 milliGRAM(s) IV Push every 24 hours  enoxaparin Injectable 40 milliGRAM(s) SubCutaneous every 24 hours  famotidine    Tablet 20 milliGRAM(s) Oral daily  pantoprazole    Tablet 40 milliGRAM(s) Oral before breakfast    MEDICATIONS  (PRN):  acetaminophen     Tablet .. 650 milliGRAM(s) Oral every 6 hours PRN Temp greater or equal to 38C (100.4F), Mild Pain (1 - 3)  aluminum hydroxide/magnesium hydroxide/simethicone Suspension 30 milliLiter(s) Oral every 4 hours PRN Dyspepsia  melatonin 3 milliGRAM(s) Oral at bedtime PRN Insomnia  ondansetron Injectable 4 milliGRAM(s) IV Push every 8 hours PRN Nausea and/or Vomiting      VITALS:  T(F): 98.5 (07-14-23 @ 07:49), Max: 98.5 (07-14-23 @ 07:49)  HR: 72 (07-14-23 @ 07:49) (72 - 88)  BP: 121/55 (07-14-23 @ 07:49) (114/57 - 134/46)  RR: 18 (07-14-23 @ 07:49) (18 - 18)  SpO2: 95% (07-14-23 @ 07:49) (88% - 95%)      PHYSICAL EXAM:  GEN: NAD  HEENT:  pupils equal and reactive, EOMI, no oropharyngeal lesions, erythema, exudates, oral thrush  NECK:   supple, no carotid bruits, no palpable lymph nodes, no thyromegaly  CV:  +S1, +S2, regular, no murmurs or rubs  RESP:   lungs clear to auscultation bilaterally, no wheezing, rales, rhonchi, good air entry bilaterally  BREAST:  not examined  GI:  abdomen soft, non-tender, non-distended, normal BS, no bruits, no abdominal masses, no palpable masses  RECTAL:  not examined  :  not examined  MSK:   normal muscle tone, no atrophy, no rigidity, no contractions  EXT:  no clubbing, no cyanosis, no edema, no calf pain, swelling or erythema  VASCULAR:  pulses equal and symmetric in the upper and lower extremities  NEURO:  AAOX3, no focal neurological deficits, follows all commands, able to move extremities spontaneously  SKIN:  no ulcers, lesions or rashes    LABS:                            11.0   7.82  )-----------( 153      ( 14 Jul 2023 07:02 )             35.4     07-14    139  |  107  |  22  ----------------------------<  91  4.3   |  29  |  0.81    Ca    8.8      14 Jul 2023 07:02  Mg     1.8     07-13    TPro  7.9  /  Alb  3.4  /  TBili  0.4  /  DBili  x   /  AST  20  /  ALT  19  /  AlkPhos  114  07-13        LIVER FUNCTIONS - ( 13 Jul 2023 13:21 )  Alb: 3.4 g/dL / Pro: 7.9 gm/dL / ALK PHOS: 114 U/L / ALT: 19 U/L / AST: 20 U/L / GGT: x             Urinalysis Basic - ( 14 Jul 2023 07:02 )  Color: x / Appearance: x / SG: x / pH: x  Gluc: 91 mg/dL / Ketone: x  / Bili: x / Urobili: x   Blood: x / Protein: x / Nitrite: x   Leuk Esterase: x / RBC: x / WBC x   Sq Epi: x / Non Sq Epi: x / Bacteria: x        Lactate, Blood: 1.4 mmol/L (07-13 @ 15:11)    EKG No acute changes< from: CT Chest No Cont (07.13.23 @ 15:30) >  IMPRESSION: Bilateral clusters of nodular opacities suggestive of   pneumonia with endobronchial spread. A 1-3 month follow-up noncontrast   chest CT is recommended to ensure resolution.    --- End of Report ---    < end of copied text >  < from: CT Head No Cont (07.13.23 @ 13:22) >  IMPRESSION:  No acute intracranial hemorrhage    No acute fracture cervical spine. If pain persists, follow-up MRI exam   recommended (if no contraindication).    Incompletely visualized age-indeterminate compression deformity T3   vertebralbody. Correlate clinically with site of pain/injury. Dedicated   thoracic imaging could be obtained as clinically warranted    --- End of Report ---        < end of copied text >

## 2023-07-16 ENCOUNTER — TRANSCRIPTION ENCOUNTER (OUTPATIENT)
Age: 83
End: 2023-07-16

## 2023-07-16 VITALS
HEART RATE: 75 BPM | OXYGEN SATURATION: 96 % | TEMPERATURE: 98 F | RESPIRATION RATE: 17 BRPM | SYSTOLIC BLOOD PRESSURE: 149 MMHG | DIASTOLIC BLOOD PRESSURE: 58 MMHG

## 2023-07-16 PROCEDURE — 93010 ELECTROCARDIOGRAM REPORT: CPT

## 2023-07-16 PROCEDURE — 99239 HOSP IP/OBS DSCHRG MGMT >30: CPT

## 2023-07-16 RX ORDER — AZITHROMYCIN 500 MG/1
1 TABLET, FILM COATED ORAL
Qty: 3 | Refills: 0
Start: 2023-07-16 | End: 2023-07-18

## 2023-07-16 RX ORDER — FAMOTIDINE 10 MG/ML
1 INJECTION INTRAVENOUS
Qty: 0 | Refills: 0 | DISCHARGE
Start: 2023-07-16

## 2023-07-16 RX ADMIN — PANTOPRAZOLE SODIUM 40 MILLIGRAM(S): 20 TABLET, DELAYED RELEASE ORAL at 05:41

## 2023-07-16 RX ADMIN — FAMOTIDINE 20 MILLIGRAM(S): 10 INJECTION INTRAVENOUS at 09:29

## 2023-07-16 NOTE — DISCHARGE NOTE NURSING/CASE MANAGEMENT/SOCIAL WORK - PATIENT PORTAL LINK FT
You can access the FollowMyHealth Patient Portal offered by [facility] by registering at the following website: [email or web address]. By joining [employer]’s FollowMyHealth portal, you will also be able to view your health information using other applications (apps) compatible with our system.

## 2023-07-16 NOTE — DISCHARGE NOTE PROVIDER - NSDCCPCAREPLAN_GEN_ALL_CORE_FT
Patient has reached the [facility]  For his medications. Patient states Des Guerrero is over $400. Asking if he can be changed to a lower cost  Medication? Please advise. PRINCIPAL DISCHARGE DIAGNOSIS  Diagnosis: Pneumonia  Assessment and Plan of Treatment:

## 2023-07-16 NOTE — DISCHARGE NOTE PROVIDER - CARE PROVIDER_API CALL
Juan Ramon Neumann  Internal Medicine  [address] [place] [address]  Phone: [phone]  Fax: [phone]  Follow Up Time: 1 week

## 2023-07-16 NOTE — DISCHARGE NOTE PROVIDER - NSDCMRMEDTOKEN_GEN_ALL_CORE_FT
escitalopram 10 mg oral tablet: 1 tab(s) orally once a day  famotidine 20 mg oral tablet: 1 tab(s) orally once a day  pantoprazole 40 mg oral delayed release tablet: 1 tab(s) orally once a day  PreserVision AREDS oral capsule:   ursodiol 500 mg oral tablet: 1 tab(s) orally 2 times a day  Zithromax 250 mg oral tablet: 1 tab(s) orally once a day

## 2023-07-16 NOTE — CHART NOTE - NSCHARTNOTEFT_GEN_A_CORE
Received call at 5:37am and was notified that patient had a 2nd degree heart block type 2 at 2:38am, and that patient was sleeping and asymptomatic at that time. Presently HR 68 bpm. Will order EKG + cardio consult

## 2023-07-16 NOTE — DISCHARGE NOTE PROVIDER - HOSPITAL COURSE
Hospital course-  84 y/o female with a PMHx of acid reflux, anxiety, emphysema, ITP, mild emphysema, osteoarthritis, primary biliary cholangitis, pulmonary nodule, pure hypercholesterolemia, spinal stenosis presents to the ED BIBA from home for evaluation. Pt reports she woke up with generalized weakness, knew she could not get to the bathroom by walking then crawled on her hands and knees. Prior to coming here she has later large dinner and wine. She was found to have pneumonia and treated here for possible CAP. Also watched in tele. Discussed finding with Dr. 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 At this point pt is feeling better, hemodynamically stable and is being discharged with further management as an outpt, time spent 45 minutes   #I have discussed with pt need for follow up with her pulmologist and PMD for repeat of chest CT to follow up on those lung lesion, pt understood me and verbalized me back         PHYSICAL EXAM:    Vital Signs Last 24 Hrs  T(C): 36.6 (16 Jul 2023 07:48), Max: 36.9 (15 Jul 2023 14:53)  T(F): 97.9 (16 Jul 2023 07:48), Max: 98.5 (15 Jul 2023 14:53)  HR: 75 (16 Jul 2023 07:48) (74 - 75)  BP: 149/58 (16 Jul 2023 07:48) (137/61 - 153/75)  BP(mean): --  RR: 17 (16 Jul 2023 07:48) (17 - 18)  SpO2: 96% (16 Jul 2023 07:48) (92% - 96%)    Parameters below as of 16 Jul 2023 07:48  Patient On (Oxygen Delivery Method): room air        GENERAL: comfortable   HEAD:  Atraumatic, Normocephalic  EYES: EOMI, PERRLA, conjunctiva and sclera clear  HEENT: Moist mucous membranes  NECK: Supple, No JVD  NERVOUS SYSTEM:  Alert & Oriented X3, Motor Strength 5/5 B/L upper and lower extremities; DTRs 2+ intact and symmetric  CHEST/LUNG: Clear to auscultation bilaterally; No rales, rhonchi, wheezing, or rubs  HEART:S1S2 normal, no murmer  ABDOMEN: Soft, Nontender, Nondistended; Bowel sounds present  GENITOURINARY- Voiding, no palpable bladder  EXTREMITIES:  2+ Peripheral Pulses, No clubbing, cyanosis, or edema  MUSCULOSKELTAL- No muscle tenderness, Muscle tone normal, No joint tenderness, no Joint swelling, Joint range of motion-normal  SKIN-no rash, no lesion  PSYCH- Mood stable  LYMPH Node- No palpable lymph node    LABS:                        11.9   6.22  )-----------( 184      ( 15 Jul 2023 08:25 )             37.3     07-15    138  |  106  |  21  ----------------------------<  115<H>  4.2   |  29  |  0.88    Ca    9.1      15 Jul 2023 08:25        Urinalysis Basic - ( 15 Jul 2023 08:25 )    Color: x / Appearance: x / SG: x / pH: x  Gluc: 115 mg/dL / Ketone: x  / Bili: x / Urobili: x   Blood: x / Protein: x / Nitrite: x

## 2023-07-16 NOTE — PROVIDER CONTACT NOTE (OTHER) - SITUATION
Received call from telemetry tech, patient had episode of second degree type 2 heart block on remote telemetry. Patient asymptomatic, sleeping in bed.

## 2023-07-17 ENCOUNTER — TRANSCRIPTION ENCOUNTER (OUTPATIENT)
Age: 83
End: 2023-07-17

## 2023-07-18 ENCOUNTER — APPOINTMENT (OUTPATIENT)
Dept: CARE COORDINATION | Facility: HOME HEALTH | Age: 83
End: 2023-07-18
Payer: MEDICARE

## 2023-07-18 VITALS
RESPIRATION RATE: 17 BRPM | HEART RATE: 82 BPM | OXYGEN SATURATION: 95 % | DIASTOLIC BLOOD PRESSURE: 70 MMHG | SYSTOLIC BLOOD PRESSURE: 152 MMHG

## 2023-07-18 DIAGNOSIS — F41.9 ANXIETY DISORDER, UNSPECIFIED: ICD-10-CM

## 2023-07-18 DIAGNOSIS — J18.9 PNEUMONIA, UNSPECIFIED ORGANISM: ICD-10-CM

## 2023-07-18 DIAGNOSIS — K74.3 PRIMARY BILIARY CIRRHOSIS: ICD-10-CM

## 2023-07-18 DIAGNOSIS — K21.9 GASTRO-ESOPHAGEAL REFLUX DISEASE W/OUT ESOPHAGITIS: ICD-10-CM

## 2023-07-18 DIAGNOSIS — R03.0 ELEVATED BLOOD-PRESSURE READING, W/OUT DIAGNOSIS OF HYPERTENSION: ICD-10-CM

## 2023-07-18 PROCEDURE — 99495 TRANSJ CARE MGMT MOD F2F 14D: CPT

## 2023-07-18 RX ORDER — FAMOTIDINE 20 MG/1
20 TABLET, FILM COATED ORAL DAILY
Refills: 0 | Status: DISCONTINUED | COMMUNITY
Start: 2023-07-18 | End: 2023-07-18

## 2023-07-18 RX ORDER — PANTOPRAZOLE 40 MG/1
40 TABLET, DELAYED RELEASE ORAL DAILY
Qty: 30 | Refills: 0 | Status: ACTIVE | COMMUNITY
Start: 2023-07-18

## 2023-07-18 RX ORDER — AZITHROMYCIN 250 MG/1
250 TABLET, FILM COATED ORAL DAILY
Refills: 0 | Status: ACTIVE | COMMUNITY
Start: 2023-07-18

## 2023-07-18 RX ORDER — VIT A/VIT C/VIT E/ZINC/COPPER 2148-113
TABLET ORAL DAILY
Refills: 0 | Status: ACTIVE | COMMUNITY
Start: 2023-07-18

## 2023-07-18 RX ORDER — ESCITALOPRAM OXALATE 10 MG/1
10 TABLET, FILM COATED ORAL DAILY
Refills: 0 | Status: ACTIVE | COMMUNITY
Start: 2023-07-18

## 2023-07-18 RX ORDER — URSODIOL 500 MG/1
500 TABLET ORAL TWICE DAILY
Refills: 0 | Status: ACTIVE | COMMUNITY
Start: 2023-07-18

## 2023-07-18 NOTE — PHYSICAL EXAM
[No Acute Distress] : no acute distress [Well Nourished] : well nourished [Well Developed] : well developed [Well-Appearing] : well-appearing [Normal Sclera/Conjunctiva] : normal sclera/conjunctiva [Normal Outer Ear/Nose] : the outer ears and nose were normal in appearance [Normal Oropharynx] : the oropharynx was normal [Supple] : supple [No Respiratory Distress] : no respiratory distress  [Clear to Auscultation] : lungs were clear to auscultation bilaterally [No Accessory Muscle Use] : no accessory muscle use [Normal Rate] : normal rate  [Regular Rhythm] : with a regular rhythm [Normal S1, S2] : normal S1 and S2 [Pedal Pulses Present] : the pedal pulses are present [No Edema] : there was no peripheral edema [No Extremity Clubbing/Cyanosis] : no extremity clubbing/cyanosis [Soft] : abdomen soft [Non Tender] : non-tender [Non-distended] : non-distended [Normal Bowel Sounds] : normal bowel sounds [No Spinal Tenderness] : no spinal tenderness [Grossly Normal Strength/Tone] : grossly normal strength/tone [No Rash] : no rash [Coordination Grossly Intact] : coordination grossly intact [No Focal Deficits] : no focal deficits [Normal Affect] : the affect was normal [Alert and Oriented x3] : oriented to person, place, and time [Normal Insight/Judgement] : insight and judgment were intact [de-identified] : no thrush

## 2023-07-18 NOTE — PLAN
[FreeTextEntry1] : A/P:\par # CAP\par - s/p azithromycin and Rocephin\par - s/p swallow eval for possible aspiration with recurrent PNA\par - complete course azithro\par - avoid sick contacts, good handwashing, GERD precautions\par - f/u Pulm Dr. MOREIRA\par \par # GERD\par - reports as severe\par - con't protonix daily\par - avoid triggers, small meals, upright for at least 30 minutes after eating\par - s/p Swallow eval: regular diet, thins, does not recommend MBS at this time\par - f/u GI\par \par # Elevated BP without history:\par - patient will purchase BP cuff, monitor & keep diary\par - Low Na diet\par - Call PCP today for f/u visit and further evaluation\par \par # Primary biliary cholangitis:\par - con't urosodiol\par - management per GI

## 2023-07-18 NOTE — HISTORY OF PRESENT ILLNESS
[Post-hospitalization from ___ Hospital] : Post-hospitalization from [unfilled] Hospital [Admitted on: ___] : The patient was admitted on [unfilled] [Discharged on ___] : discharged on [unfilled] [Discharge Summary] : discharge summary [Discharge Med List] : discharge medication list [Med Reconciliation] : medication reconciliation has been completed [Patient Contacted By: ____] : and contacted by [unfilled] [FreeTextEntry2] : Hospital course-\par 82 y/o female with a PMHx of acid reflux, anxiety, emphysema, ITP, mild emphysema, osteoarthritis, primary biliary cholangitis, pulmonary nodule, pure hypercholesterolemia, spinal stenosis presents to the ED BIBA from home for evaluation. Pt reports she woke up with generalized weakness, knew she could not get to the bathroom by walking then crawled on her hands and knees. Prior to coming here she has later large dinner and wine. She was found to have pneumonia and treated here for possible CAP. Also watched in tele. Discussed finding with Dr. 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 At this point pt is feeling better, hemodynamically stable and is being discharged with further management as an outpt, time spent 45 minutes \par #I have discussed with pt need for follow up with her pulmologist and PMD for repeat of chest CT to follow up on those lung lesion, pt understood me and verbalized me back \par \par CC:\par Pt is seen at home s/p recent admission for PNA. Pt is temporarily unable to leave home as it requires a considerable and taxing effort\par HPI:\par Pt Is a 82 y/o male enrolled in the [facility] seen at home s/p a recent admission for PNA. Pt was contacted by TCM RN on 7/17 and med rec was done within 48 hours of DC.\par \par Upon examination A&O x 3 NAD. 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 PNA: to complete one more day azithro. HTN: BP mildly elevated in hospital and on exam today, not on any meds. Reports BP was always good. GERD: Asymptomatic at present. Primary biliary cholangitis, managed by GI. Declined HC services\par \par \par

## 2023-07-24 DIAGNOSIS — K74.3 PRIMARY BILIARY CIRRHOSIS: ICD-10-CM

## 2023-07-24 DIAGNOSIS — M48.00 SPINAL STENOSIS, SITE UNSPECIFIED: ICD-10-CM

## 2023-07-24 DIAGNOSIS — D69.3 IMMUNE THROMBOCYTOPENIC PURPURA: ICD-10-CM

## 2023-07-24 DIAGNOSIS — Z88.0 ALLERGY STATUS TO PENICILLIN: ICD-10-CM

## 2023-07-24 DIAGNOSIS — J43.9 EMPHYSEMA, UNSPECIFIED: ICD-10-CM

## 2023-07-24 DIAGNOSIS — K21.9 GASTRO-ESOPHAGEAL REFLUX DISEASE WITHOUT ESOPHAGITIS: ICD-10-CM

## 2023-07-24 DIAGNOSIS — M19.90 UNSPECIFIED OSTEOARTHRITIS, UNSPECIFIED SITE: ICD-10-CM

## 2023-07-24 DIAGNOSIS — Z88.2 ALLERGY STATUS TO SULFONAMIDES: ICD-10-CM

## 2023-07-24 DIAGNOSIS — I49.1 ATRIAL PREMATURE DEPOLARIZATION: ICD-10-CM

## 2023-07-24 DIAGNOSIS — F41.9 ANXIETY DISORDER, UNSPECIFIED: ICD-10-CM

## 2023-07-24 DIAGNOSIS — J18.9 PNEUMONIA, UNSPECIFIED ORGANISM: ICD-10-CM

## 2023-07-24 DIAGNOSIS — Z87.891 PERSONAL HISTORY OF NICOTINE DEPENDENCE: ICD-10-CM

## 2023-07-24 DIAGNOSIS — E78.00 PURE HYPERCHOLESTEROLEMIA, UNSPECIFIED: ICD-10-CM

## 2023-07-31 ENCOUNTER — TRANSCRIPTION ENCOUNTER (OUTPATIENT)
Age: 83
End: 2023-07-31

## 2023-08-08 ENCOUNTER — APPOINTMENT (OUTPATIENT)
Dept: CT IMAGING | Facility: CLINIC | Age: 83
End: 2023-08-08
Payer: MEDICARE

## 2023-08-08 ENCOUNTER — OUTPATIENT (OUTPATIENT)
Dept: OUTPATIENT SERVICES | Facility: HOSPITAL | Age: 83
LOS: 1 days | End: 2023-08-08
Payer: MEDICARE

## 2023-08-08 DIAGNOSIS — Z90.49 ACQUIRED ABSENCE OF OTHER SPECIFIED PARTS OF DIGESTIVE TRACT: Chronic | ICD-10-CM

## 2023-08-08 DIAGNOSIS — Z98.890 OTHER SPECIFIED POSTPROCEDURAL STATES: Chronic | ICD-10-CM

## 2023-08-08 DIAGNOSIS — Z95.818 PRESENCE OF OTHER CARDIAC IMPLANTS AND GRAFTS: Chronic | ICD-10-CM

## 2023-08-08 DIAGNOSIS — Z98.49 CATARACT EXTRACTION STATUS, UNSPECIFIED EYE: Chronic | ICD-10-CM

## 2023-08-08 DIAGNOSIS — Z00.8 ENCOUNTER FOR OTHER GENERAL EXAMINATION: ICD-10-CM

## 2023-08-08 PROCEDURE — 71250 CT THORAX DX C-: CPT | Mod: 26,MH

## 2023-08-08 PROCEDURE — 71250 CT THORAX DX C-: CPT

## 2023-12-07 ENCOUNTER — EMERGENCY (EMERGENCY)
Facility: HOSPITAL | Age: 83
LOS: 0 days | Discharge: HOME | End: 2023-12-07
Attending: EMERGENCY MEDICINE
Payer: MEDICARE

## 2023-12-07 VITALS
DIASTOLIC BLOOD PRESSURE: 70 MMHG | OXYGEN SATURATION: 96 % | TEMPERATURE: 98 F | SYSTOLIC BLOOD PRESSURE: 164 MMHG | RESPIRATION RATE: 18 BRPM | HEART RATE: 77 BPM

## 2023-12-07 VITALS — WEIGHT: 214.95 LBS | HEIGHT: 66 IN

## 2023-12-07 DIAGNOSIS — Z88.0 ALLERGY STATUS TO PENICILLIN: ICD-10-CM

## 2023-12-07 DIAGNOSIS — Z98.890 OTHER SPECIFIED POSTPROCEDURAL STATES: Chronic | ICD-10-CM

## 2023-12-07 DIAGNOSIS — Z95.818 PRESENCE OF OTHER CARDIAC IMPLANTS AND GRAFTS: Chronic | ICD-10-CM
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
## 2023-12-07 DIAGNOSIS — Z98.49 CATARACT EXTRACTION STATUS, UNSPECIFIED EYE: Chronic | ICD-10-CM

## 2023-12-07 DIAGNOSIS — Z90.49 ACQUIRED ABSENCE OF OTHER SPECIFIED PARTS OF DIGESTIVE TRACT: Chronic | ICD-10-CM
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
## 2023-12-07 DIAGNOSIS — S09.90XA UNSPECIFIED INJURY OF HEAD, INITIAL ENCOUNTER: ICD-10-CM

## 2023-12-07 DIAGNOSIS — Z88.2 ALLERGY STATUS TO SULFONAMIDES: ICD-10-CM

## 2023-12-07 PROCEDURE — 70450 CT HEAD/BRAIN W/O DYE: CPT | Mod: 26,MA

## 2023-12-07 PROCEDURE — 72125 CT NECK SPINE W/O DYE: CPT | Mod: 26,MA

## 2023-12-07 PROCEDURE — 70450 CT HEAD/BRAIN W/O DYE: CPT | Mod: MA

## 2023-12-07 PROCEDURE — 72125 CT NECK SPINE W/O DYE: CPT | Mod: MA

## 2023-12-07 PROCEDURE — 99284 EMERGENCY DEPT VISIT MOD MDM: CPT | Mod: 25

## 2023-12-07 PROCEDURE — 99284 EMERGENCY DEPT VISIT MOD MDM: CPT

## 2023-12-07 NOTE — ED PROVIDER NOTE - PROGRESS NOTE DETAILS
Che Alvares for ED attending, Dr. Joe: CT scans demonstrate no acute traumatic pathology, stable for d/c home

## 2023-12-07 NOTE — ED PROVIDER NOTE - PHYSICAL EXAMINATION
Constitutional: NAD AOx3  Eyes: PERRL EOMI  Head: Normocephalic atraumatic  Neck: no c-spine ttp  Mouth: MMM  Cardiac: regular rate and rhythm  Resp: Lungs CTAB  GI: Abd s/nd/nt  Neuro: CN2-12 grossly intact, ALLEN x 4  Skin: No visible rashes

## 2023-12-07 NOTE — ED PROVIDER NOTE - PATIENT PORTAL LINK FT
You can access the FollowMyHealth Patient Portal offered by [facility] by registering at the following website: [email or web address]. By joining [employer]’s FollowMyHealth portal, you will also be able to view your health information using other applications (apps) compatible with our system. You can access the FollowMyHealth Patient Portal offered by [facility] by registering at the following website: [email or web address]. By joining [employer]’s FollowMyHealth portal, you will also be able to view your health information using other applications (apps) compatible with our system.

## 2023-12-07 NOTE — ED ADULT NURSE NOTE - NSFALLHARMRISKINTERV_ED_ALL_ED
Communicate risk of Fall with Harm to all staff, patient, and family/Provide visual cue: red socks, yellow wristband, yellow gown, etc/Reinforce activity limits and safety measures with patient and family/Bed in lowest position, wheels locked, appropriate side rails in place/Call bell, personal items and telephone in reach/Instruct patient to call for assistance before getting out of bed/chair/stretcher/Non-slip footwear applied when patient is off stretcher/[place] to call system/Physically safe environment - no spills, clutter or unnecessary equipment/Purposeful Proactive Rounding/Room/bathroom lighting operational, light cord in reach Communicate risk of Fall with Harm to all staff, patient, and family/Provide visual cue: red socks, yellow wristband, yellow gown, etc/Reinforce activity limits and safety measures with patient and family/Bed in lowest position, wheels locked, appropriate side rails in place/Call bell, personal items and telephone in reach/Instruct patient to call for assistance before getting out of bed/chair/stretcher/Non-slip footwear applied when patient is off stretcher/[place] to call system/Physically safe environment - no spills, clutter or unnecessary equipment/Purposeful Proactive Rounding/Room/bathroom lighting operational, light cord in reach

## 2023-12-07 NOTE — ED ADULT NURSE NOTE - OBJECTIVE STATEMENT
Pt is 84yo F, A&Ox4 who presents to ED with c/o unwitnessed fall. Pt reports slipping on towel when coming out of shower earlier today. (+) HS, (-)LOC, use of blood thinners. Pt denies dizziness, headache, changes in vision, numbness, tingling. Upon palpation, tenderness noted to top of head. No edema, laceration, or hematoma noted.   NA called 1827 Pt is 82yo F, A&Ox4 who presents to ED with c/o unwitnessed fall. Pt reports slipping on towel when coming out of shower earlier today. (+) HS, (-)LOC, use of blood thinners. Pt denies dizziness, headache, changes in vision, numbness, tingling. Upon palpation, tenderness noted to top of head. No edema, laceration, or hematoma noted.   NA called 1827

## 2023-12-07 NOTE — ED ADULT TRIAGE NOTE - CHIEF COMPLAINT QUOTE
pt ambulatory to triage s/p fall this morning at 1100. pt reports she was in the shower this morning when she slipped and fell, hitting her head on the tub. +HS -LOC -BT. pt stating she had a glass of wine today. pt denies HA, AMS, changes in vision. pmhx pancreatitis and GERD. no medications taken PTA. MD Mota made aware, NA activated at 1827.

## 2023-12-07 NOTE — ED PROVIDER NOTE - CLINICAL SUMMARY MEDICAL DECISION MAKING FREE TEXT BOX
84 y/o female presents to the ED s/p slip and fall in shower earlier today at 11:00 given age will obtain CT head and neck

## 2023-12-07 NOTE — ED PROVIDER NOTE - OBJECTIVE STATEMENT
84 y/o female presents to the ED s/p fall this morning around 11:00. Pt states she was in the shower and saw a bug so she went go get a towel and leaned down to squish bug and slipped forward and hit her head against the tile wall. Pt denies LOC, N/V. Pt denies headache states she has head discomfort. Pt report ate large lunch and dinner throughout the day but was urged to come to the ED by family and friends. 82 y/o female presents to the ED s/p fall this morning around 11:00. Pt states she was in the shower and saw a bug so she went go get a towel and leaned down to squish bug and slipped forward and hit her head against the tile wall. Pt denies LOC, N/V. Pt denies headache states she has head discomfort. Pt report ate large lunch and dinner throughout the day but was urged to come to the ED by family and friends.

## 2024-02-05 NOTE — DISCHARGE NOTE NURSING/CASE MANAGEMENT/SOCIAL WORK - NSDCVIVACCINE_GEN_ALL_CORE_FT
influenza, high-dose, quadrivalent; 04-Sep-2022 10:37; Hyacinth Redmond (RN); [employer]; [id number] (Exp. Date: 30-Jun-2023); IntraMuscular; Deltoid Left.; 0.7 milliLiter(s); VIS (VIS Published: 06-Aug-2021, VIS Presented: 04-Sep-2022);    66

## 2024-07-23 ENCOUNTER — TRANSCRIPTION ENCOUNTER (OUTPATIENT)
Age: 84
End: 2024-07-23

## 2024-07-25 ENCOUNTER — TRANSCRIPTION ENCOUNTER (OUTPATIENT)
Age: 84
End: 2024-07-25

## 2024-11-23 NOTE — BRIEF OPERATIVE NOTE - NSICDXBRIEFPREOP_GEN_ALL_CORE_FT
Group Topic: BH Activity Group    Date: 11/23/2024  Start Time: 1030  End Time: 1120  Facilitators: Leah Cordero [facility]    Focus: Emotion Regulation  Number in attendance: 6    Method: Group   Attendance: Present  Participation: Minimal  Patient Response: Attentive and Interested in topic  Mood: Normal  Affect: Type: Euthymic (normal mood)   Range: Full (normal)   Congruency: Congruent   Stability: Stable  Behavior/Socialization: Appropriate to group and Engaged  Thought Process: Focused  Task Performance: Follows directions  Patient Evaluation: Leaves class before finished     PRE-OP DIAGNOSIS:  Osteoarthritis of right hip 11-Jun-2021 09:49:29  Hayes James

## 2025-04-14 ENCOUNTER — APPOINTMENT (OUTPATIENT)
Facility: CLINIC | Age: 85
End: 2025-04-14

## 2025-05-30 NOTE — ED ADULT TRIAGE NOTE - CHIEF COMPLAINT QUOTE
Pt arrived ambulatory through triage for c/o vomiting since Sunday. Pt endorses weakness and dizziness. Pt states that she can not keep any food or water down. - CP/SOB/headache   Pt comes to the ED complaining of nausea and generalized not feeling well., Pt states that she went out to eat last night and in the middle of the night she got up feeling as if she had to vomit, but couldn't. Pt states that she continues to feel nauseous and weak.

## 2025-06-17 RX ORDER — CLINDAMYCIN PHOSPHATE 150 MG/ML
1 VIAL (ML) INJECTION
Refills: 0 | DISCHARGE
Start: 2025-06-17

## 2025-06-20 ENCOUNTER — INPATIENT (INPATIENT)
Facility: HOSPITAL | Age: 85
LOS: 1 days | Discharge: HOME | DRG: 313 | End: 2025-06-22
Attending: INTERNAL MEDICINE | Admitting: STUDENT IN AN ORGANIZED HEALTH CARE EDUCATION/TRAINING PROGRAM
Payer: MEDICARE

## 2025-06-20 VITALS — WEIGHT: 220.02 LBS

## 2025-06-20 DIAGNOSIS — Z95.818 PRESENCE OF OTHER CARDIAC IMPLANTS AND GRAFTS: Chronic | ICD-10-CM

## 2025-06-20 DIAGNOSIS — Z98.890 OTHER SPECIFIED POSTPROCEDURAL STATES: Chronic | ICD-10-CM

## 2025-06-20 DIAGNOSIS — Z98.49 CATARACT EXTRACTION STATUS, UNSPECIFIED EYE: Chronic | ICD-10-CM

## 2025-06-20 DIAGNOSIS — R07.9 CHEST PAIN, UNSPECIFIED: ICD-10-CM

## 2025-06-20 DIAGNOSIS — Z90.49 ACQUIRED ABSENCE OF OTHER SPECIFIED PARTS OF DIGESTIVE TRACT: Chronic | ICD-10-CM

## 2025-06-20 LAB
ADD ON TEST-SPECIMEN IN LAB: SIGNIFICANT CHANGE UP
ADD ON TEST-SPECIMEN IN LAB: SIGNIFICANT CHANGE UP
ALBUMIN SERPL ELPH-MCNC: 3.6 G/DL — SIGNIFICANT CHANGE UP (ref 3.3–5)
ALP SERPL-CCNC: 108 U/L — SIGNIFICANT CHANGE UP (ref 40–120)
ALT FLD-CCNC: 14 U/L — SIGNIFICANT CHANGE UP (ref 12–78)
ANION GAP SERPL CALC-SCNC: 3 MMOL/L — LOW (ref 5–17)
AST SERPL-CCNC: 18 U/L — SIGNIFICANT CHANGE UP (ref 15–37)
BASOPHILS # BLD AUTO: 0.04 K/UL — SIGNIFICANT CHANGE UP (ref 0–0.2)
BASOPHILS NFR BLD AUTO: 0.5 % — SIGNIFICANT CHANGE UP (ref 0–2)
BILIRUB SERPL-MCNC: 0.4 MG/DL — SIGNIFICANT CHANGE UP (ref 0.2–1.2)
BUN SERPL-MCNC: 17 MG/DL — SIGNIFICANT CHANGE UP (ref 7–23)
CALCIUM SERPL-MCNC: 9.2 MG/DL — SIGNIFICANT CHANGE UP (ref 8.5–10.1)
CHLORIDE SERPL-SCNC: 101 MMOL/L — SIGNIFICANT CHANGE UP (ref 96–108)
CO2 SERPL-SCNC: 29 MMOL/L — SIGNIFICANT CHANGE UP (ref 22–31)
CREAT SERPL-MCNC: 0.85 MG/DL — SIGNIFICANT CHANGE UP (ref 0.5–1.3)
EGFR: 68 ML/MIN/1.73M2 — SIGNIFICANT CHANGE UP
EGFR: 68 ML/MIN/1.73M2 — SIGNIFICANT CHANGE UP
EOSINOPHIL # BLD AUTO: 0.17 K/UL — SIGNIFICANT CHANGE UP (ref 0–0.5)
EOSINOPHIL NFR BLD AUTO: 1.9 % — SIGNIFICANT CHANGE UP (ref 0–6)
GLUCOSE SERPL-MCNC: 103 MG/DL — HIGH (ref 70–99)
HCT VFR BLD CALC: 33.7 % — LOW (ref 34.5–45)
HGB BLD-MCNC: 10.1 G/DL — LOW (ref 11.5–15.5)
IMM GRANULOCYTES # BLD AUTO: 0.03 K/UL — SIGNIFICANT CHANGE UP (ref 0–0.07)
IMM GRANULOCYTES NFR BLD AUTO: 0.3 % — SIGNIFICANT CHANGE UP (ref 0–0.9)
LIDOCAIN IGE QN: 22 U/L — SIGNIFICANT CHANGE UP (ref 13–75)
LYMPHOCYTES # BLD AUTO: 1.12 K/UL — SIGNIFICANT CHANGE UP (ref 1–3.3)
LYMPHOCYTES NFR BLD AUTO: 12.8 % — LOW (ref 13–44)
MCHC RBC-ENTMCNC: 23.2 PG — LOW (ref 27–34)
MCHC RBC-ENTMCNC: 30 G/DL — LOW (ref 32–36)
MCV RBC AUTO: 77.5 FL — LOW (ref 80–100)
MONOCYTES # BLD AUTO: 0.67 K/UL — SIGNIFICANT CHANGE UP (ref 0–0.9)
MONOCYTES NFR BLD AUTO: 7.7 % — SIGNIFICANT CHANGE UP (ref 2–14)
NEUTROPHILS # BLD AUTO: 6.72 K/UL — SIGNIFICANT CHANGE UP (ref 1.8–7.4)
NEUTROPHILS NFR BLD AUTO: 76.8 % — SIGNIFICANT CHANGE UP (ref 43–77)
NRBC # BLD AUTO: 0 K/UL — SIGNIFICANT CHANGE UP (ref 0–0)
NRBC # FLD: 0 K/UL — SIGNIFICANT CHANGE UP (ref 0–0)
NRBC BLD AUTO-RTO: 0 /100 WBCS — SIGNIFICANT CHANGE UP (ref 0–0)
NT-PROBNP SERPL-SCNC: 646 PG/ML — HIGH (ref 0–450)
PLATELET # BLD AUTO: 211 K/UL — SIGNIFICANT CHANGE UP (ref 150–400)
PMV BLD: 10.9 FL — SIGNIFICANT CHANGE UP (ref 7–13)
POTASSIUM SERPL-MCNC: 4.5 MMOL/L — SIGNIFICANT CHANGE UP (ref 3.5–5.3)
POTASSIUM SERPL-SCNC: 4.5 MMOL/L — SIGNIFICANT CHANGE UP (ref 3.5–5.3)
PROT SERPL-MCNC: 7.8 GM/DL — SIGNIFICANT CHANGE UP (ref 6–8.3)
RBC # BLD: 4.35 M/UL — SIGNIFICANT CHANGE UP (ref 3.8–5.2)
RBC # FLD: 17.1 % — HIGH (ref 10.3–14.5)
SODIUM SERPL-SCNC: 133 MMOL/L — LOW (ref 135–145)
TROPONIN I, HIGH SENSITIVITY RESULT: 13.53 NG/L — SIGNIFICANT CHANGE UP
TROPONIN I, HIGH SENSITIVITY RESULT: 14.27 NG/L — SIGNIFICANT CHANGE UP
WBC # BLD: 8.75 K/UL — SIGNIFICANT CHANGE UP (ref 3.8–10.5)
WBC # FLD AUTO: 8.75 K/UL — SIGNIFICANT CHANGE UP (ref 3.8–10.5)

## 2025-06-20 PROCEDURE — 99285 EMERGENCY DEPT VISIT HI MDM: CPT | Mod: FS

## 2025-06-20 PROCEDURE — 84439 ASSAY OF FREE THYROXINE: CPT

## 2025-06-20 PROCEDURE — 71260 CT THORAX DX C+: CPT | Mod: 26

## 2025-06-20 PROCEDURE — 71046 X-RAY EXAM CHEST 2 VIEWS: CPT | Mod: 26

## 2025-06-20 PROCEDURE — 85025 COMPLETE CBC W/AUTO DIFF WBC: CPT

## 2025-06-20 PROCEDURE — 74177 CT ABD & PELVIS W/CONTRAST: CPT | Mod: 26

## 2025-06-20 PROCEDURE — 83735 ASSAY OF MAGNESIUM: CPT

## 2025-06-20 PROCEDURE — 36415 COLL VENOUS BLD VENIPUNCTURE: CPT

## 2025-06-20 PROCEDURE — 84443 ASSAY THYROID STIM HORMONE: CPT

## 2025-06-20 PROCEDURE — 93306 TTE W/DOPPLER COMPLETE: CPT

## 2025-06-20 PROCEDURE — 83036 HEMOGLOBIN GLYCOSYLATED A1C: CPT

## 2025-06-20 PROCEDURE — 84100 ASSAY OF PHOSPHORUS: CPT

## 2025-06-20 PROCEDURE — 93010 ELECTROCARDIOGRAM REPORT: CPT

## 2025-06-20 PROCEDURE — 80048 BASIC METABOLIC PNL TOTAL CA: CPT

## 2025-06-20 PROCEDURE — 80061 LIPID PANEL: CPT

## 2025-06-20 PROCEDURE — 94640 AIRWAY INHALATION TREATMENT: CPT

## 2025-06-20 PROCEDURE — 80053 COMPREHEN METABOLIC PANEL: CPT

## 2025-06-20 PROCEDURE — 85027 COMPLETE CBC AUTOMATED: CPT

## 2025-06-20 RX ORDER — ACETAMINOPHEN 500 MG/5ML
650 LIQUID (ML) ORAL ONCE
Refills: 0 | Status: DISCONTINUED | OUTPATIENT
Start: 2025-06-20 | End: 2025-06-22

## 2025-06-20 RX ORDER — ONDANSETRON HCL/PF 4 MG/2 ML
4 VIAL (ML) INJECTION ONCE
Refills: 0 | Status: DISCONTINUED | OUTPATIENT
Start: 2025-06-20 | End: 2025-06-22

## 2025-06-20 RX ORDER — MAGNESIUM, ALUMINUM HYDROXIDE 200-200 MG
30 TABLET,CHEWABLE ORAL ONCE
Refills: 0 | Status: COMPLETED | OUTPATIENT
Start: 2025-06-20 | End: 2025-06-20

## 2025-06-20 RX ORDER — ASPIRIN 325 MG
1 TABLET ORAL
Refills: 0 | DISCHARGE

## 2025-06-20 RX ORDER — ONDANSETRON HCL/PF 4 MG/2 ML
4 VIAL (ML) INJECTION ONCE
Refills: 0 | Status: COMPLETED | OUTPATIENT
Start: 2025-06-20 | End: 2025-06-20

## 2025-06-20 RX ADMIN — Medication 4 MILLIGRAM(S): at 19:25

## 2025-06-20 RX ADMIN — Medication 30 MILLILITER(S): at 16:59

## 2025-06-20 NOTE — ED ADULT TRIAGE NOTE - GLASGOW COMA SCALE: BEST VERBAL RESPONSE, MLM
Linda Garay APNP 9/19/24  4:48 PM Message left for Al per patient request.   Notified ultrasound shows no blood clot in the left leg.   Hopefully feeling better with steroid.  Advised to call back if symptoms not improving  in the next few days or worsen.   (V5) oriented

## 2025-06-20 NOTE — ED ADULT NURSE REASSESSMENT NOTE - NS ED NURSE REASSESS COMMENT FT1
Pt received at 23:38 from ED GERMÁN Vides. Pt A&Ox4. Hypertensive. MD Lam made aware. Awaiting orders. Other VSS. NSR on CM. Denies chest pain, sob, n/v. Pt waiting bed assignment and further orders. Care continues as ordered. Call bell within reach. Fall and safety precautions maintained.

## 2025-06-20 NOTE — ED STATDOCS - CLINICAL SUMMARY MEDICAL DECISION MAKING FREE TEXT BOX
Plan for labs, CXR, cardiac monitor and reassess. Plan for labs, CXR, cardiac monitor and reassess-->see progress notes

## 2025-06-20 NOTE — ED STATDOCS - CARE PLAN
Principal Discharge DX:	Chest pain   1 Principal Discharge DX:	Chest pain  Secondary Diagnosis:	Hypoxia  Secondary Diagnosis:	Pulmonary edema  Secondary Diagnosis:	Epigastric pain

## 2025-06-20 NOTE — ED ADULT TRIAGE NOTE - CHIEF COMPLAINT QUOTE
bib daughter c/o chest pain and pressure, acid reflux and burping. pmh: copd, emphysema, htn, arthritis. ekg in triage.

## 2025-06-20 NOTE — ED STATDOCS - ATTENDING APP SHARED VISIT CONTRIBUTION OF CARE
I, Tammy Cohen MD,  performed the initial face to face bedside interview with this patient regarding history of present illness, review of symptoms and relevant past medical, social and family history.  I completed an independent physical examination.  I was the initial provider who evaluated this patient.   I personally saw the patient and performed a substantive portion of the visit including all aspects of the medical decision making.  I have signed out the follow up of any pending tests (i.e. labs, radiological studies) to the [facility].  I have communicated the patient’s plan of care and disposition with the [facility].  The history, relevant review of systems, past medical and surgical history, medical decision making, and physical examination was documented by the scribe in my presence and I attest to the accuracy of the documentation.

## 2025-06-20 NOTE — ED STATDOCS - PROGRESS NOTE DETAILS
signed Anne Byrnes PA-C Pt seen in intake initially by Dr Cohen.   85F here for chest tightness and excessive belching today, starting around noon. Pt says she has had these symptoms on and off over the past few days or weeks, she cannot quantify better than that at this time. Pt went to urgent care and was recommended to come to ER for a troponin. Pt did have a recent stress test with Dr Shepard that was "normal". Last cath was years ago and daughter notes there was some degree of blockage but it was not severe. pt also has hx of GERD and was started on medication which she has been taking. Pt pain free at this time. Plan labs, cxr, trop x2. Pt agrees with plan of  care. PMBRENNAN Neumann. signed Anne Byrnes PA-C   anemia similar to prior, troponins negative. Pt says she is feeling worse now, epigastric pain, more belching. pt looks uncomfortable. cxr with new bilateral trace pleural effusions. Will order lipase and CT C/A/P, plan admission. Pt agrees with admission and plan of care. signed Anne Byrnes PA-C   Pt hypoxic 90% in ED. CT with pulmonary edema, will admit for inpt workup. Pt agrees with plan of  care. Case discussed with and admission accepted by hospitalist Dr. Lam

## 2025-06-20 NOTE — ED STATDOCS - OBJECTIVE STATEMENT
85 year old female with PMHx of COPD, emphysema, arthritis, acid reflux, and former smoker (25 year history of smoking) presenting to the ED with daughter c/o intermittent chest pain tightness, acid reflux and burping for last several days. Pt was at Urgent Care today and had a normal EKG but was advised to present to the ED for evaluation. Pt follows at Dr. Shepard's office and had a recent normal cardiac stress test one week ago. Pt denies cardiac history, DM history of taking any medications for HTN (BP of 168/59 at triage). Pt is allergic to penicillamine and sulfa drugs.

## 2025-06-20 NOTE — ED ADULT NURSE NOTE - OBJECTIVE STATEMENT
presents with c/o chest pressure, acid reflux and burping. States she took her antacid this AM but doesn't remember name.

## 2025-06-20 NOTE — ED ADULT NURSE NOTE - NSICDXPASTSURGICALHX_GEN_ALL_CORE_FT
History Of Present Illness  Melita is a 4 day old female presenting with hypothermia. Patient was born at 39 weeks to a 35-year-old G1, P1 mother via IVF at [place].  Patient's mother was tested positive for GBS.  Mother received 3 bouts of antibiotics during her vaginal delivery.  Parents are from [facility] but has been living in the  for quite some time.  There is also history of beta thalassemia minor in the family.  Vaginal delivery was uncomplicated and there was no NICU stay post discharge.  Per parents, patient has been stooling, urinating and feeding well.  Minimal spit up noted.  Patient has not been fussy and they have not noticed any rashes or warm feeling to touch at home.  Parents noticed that sometimes they feel like she is shaking but they are not sure if it is only because they now know that she was noted to be hypothermia.  I had an outpatient follow-up with her pediatrician today.  Patient was noted to be hypothermic.  Family states they did drive from a car when it was little cold but she was all bundled up in her onesie.  Patient has not been exposed to any outside sick contacts or individuals.  Patient has been home since discharge from the hospital and this is her first outing.  No known pets at home.    ED Course:  On arrival to the ED patient was appearing well perfused and saturating 100% on room air.  Rectal temperature was noted to be 35 8.  Given temperature, multiple LP attempts were made however unsuccessful.    Floor Course (1/31-***)  Patient admitted to our floor in stable condition. Temperatures wnl, no further episodes of hypothermia. Patient well appearing without s/s sepsis during hospital stay***. Blood culture and urine culture NGTD***.     Patient discharged home in stable condition with follow up instructions provided.   PAST SURGICAL HISTORY:  H/O coronary angiogram 2012    S/P cholecystectomy 2011    S/P colonoscopy     Status post cataract surgery b/l    Status post placement of implantable loop recorder 2014  removed Jan 2018

## 2025-06-20 NOTE — ED STATDOCS - NSFOLLOWUPINSTRUCTIONS_ED_ALL_ED_FT
FOLLOW UP WITH YOUR CARDIOLOGIST ON MONDAY. CALL THE OFFICE TO MAKE AN APPOINTMENT. RETURN TO ER FOR ANY WORSENING SYMPTOMS OR NEW CONCERNS.     Chest Pain    WHAT YOU NEED TO KNOW:    Chest pain can be caused by a range of conditions, from not serious to life-threatening. Chest pain can be a symptom of a digestive problem, such as acid reflux or a stomach ulcer. An anxiety attack or a strong emotion, such as anger, can also cause chest pain. Infection, inflammation, or a fracture in the bones or cartilage in your chest can cause pain or discomfort. Sometimes chest pain or pressure is caused by poor blood flow to your heart (angina). Chest pain may also be caused by life-threatening conditions such as a heart attack or blood clot in your lungs.     DISCHARGE INSTRUCTIONS:    Call 911 if:     You have any of the following signs of a heart attack:   Squeezing, pressure, or pain in your chest       and any of the following:   Discomfort or pain in your back, neck, jaw, stomach, or arm       Shortness of breath      Nausea or vomiting      Lightheadedness or a sudden cold sweat        Return to the emergency department if:     You have chest discomfort that gets worse, even with medicine.      You cough or vomit blood.       Your bowel movements are black or bloody.       You cannot stop vomiting, or it hurts to swallow.     Contact your healthcare provider if:     You have questions or concerns about your condition or care.        Medicines:     Medicines may be given to treat the cause of your chest pain. Examples include pain medicine, anxiety medicine, or medicines to increase blood flow to your heart.       Do not take certain medicines without asking your healthcare provider first. These include NSAIDs, herbal or vitamin supplements, or hormones (estrogen or progestin).       Take your medicine as directed. Contact your healthcare provider if you think your medicine is not helping or if you have side effects. Tell him or her if you are allergic to any medicine. Keep a list of the medicines, vitamins, and herbs you take. Include the amounts, and when and why you take them. Bring the list or the pill bottles to follow-up visits. Carry your medicine list with you in case of an emergency.    Follow up with your healthcare provider within 72 hours, or as directed: You may need to return for more tests to find the cause of your chest pain. You may be referred to a specialist, such as a cardiologist or gastroenterologist. Write down your questions so you remember to ask them during your visits.     Healthy living tips: The following are general healthy guidelines. If your chest pain is caused by a heart problem, your healthcare provider will give you specific guidelines to follow.    Do not smoke. Nicotine and other chemicals in cigarettes and cigars can cause lung and heart damage. Ask your healthcare provider for information if you currently smoke and need help to quit. E-cigarettes or smokeless tobacco still contain nicotine. Talk to your healthcare provider before you use these products.       Eat a variety of healthy, low-fat, low-salt foods. Healthy foods include fruits, vegetables, whole-grain breads, low-fat dairy products, beans, lean meats, and fish. Ask for more information about a heart healthy diet.      Drink plenty of water every day. Your body is made of mostly water. Water helps your body to control your temperature and blood pressure. Ask your healthcare provider how much water you should drink every day.      Ask about activity. Your healthcare provider will tell you which activities to limit or avoid. Ask when you can drive, return to work, and have sex. Ask about the best exercise plan for you.      Maintain a healthy weight. Ask your healthcare provider how much you should weigh. Ask him or her to help you create a weight loss plan if you are overweight.       Get the flu and pneumonia vaccines. All adults should get the influenza (flu) vaccine. Get it every year as soon as it becomes available. The pneumococcal vaccine is given to adults aged 65 years or older. The vaccine is given every 5 years to prevent pneumococcal disease, such as pneumonia.    If you have a stent:     Carry your stent card with you at all times.       Let all healthcare providers know that you have a stent.

## 2025-06-21 ENCOUNTER — TRANSCRIPTION ENCOUNTER (OUTPATIENT)
Age: 85
End: 2025-06-21

## 2025-06-21 ENCOUNTER — RESULT REVIEW (OUTPATIENT)
Age: 85
End: 2025-06-21

## 2025-06-21 DIAGNOSIS — J44.9 CHRONIC OBSTRUCTIVE PULMONARY DISEASE, UNSPECIFIED: ICD-10-CM

## 2025-06-21 DIAGNOSIS — R10.13 EPIGASTRIC PAIN: ICD-10-CM

## 2025-06-21 DIAGNOSIS — R07.9 CHEST PAIN, UNSPECIFIED: ICD-10-CM

## 2025-06-21 LAB
A1C WITH ESTIMATED AVERAGE GLUCOSE RESULT: 5.6 % — SIGNIFICANT CHANGE UP (ref 4–5.6)
ADD ON TEST-SPECIMEN IN LAB: SIGNIFICANT CHANGE UP
ALBUMIN SERPL ELPH-MCNC: 3.4 G/DL — SIGNIFICANT CHANGE UP (ref 3.3–5)
ALP SERPL-CCNC: 96 U/L — SIGNIFICANT CHANGE UP (ref 40–120)
ALT FLD-CCNC: 11 U/L — LOW (ref 12–78)
ANION GAP SERPL CALC-SCNC: 5 MMOL/L — SIGNIFICANT CHANGE UP (ref 5–17)
AST SERPL-CCNC: 12 U/L — LOW (ref 15–37)
BILIRUB SERPL-MCNC: 0.4 MG/DL — SIGNIFICANT CHANGE UP (ref 0.2–1.2)
BUN SERPL-MCNC: 14 MG/DL — SIGNIFICANT CHANGE UP (ref 7–23)
CALCIUM SERPL-MCNC: 8.9 MG/DL — SIGNIFICANT CHANGE UP (ref 8.5–10.1)
CHLORIDE SERPL-SCNC: 105 MMOL/L — SIGNIFICANT CHANGE UP (ref 96–108)
CHOLEST SERPL-MCNC: 116 MG/DL — SIGNIFICANT CHANGE UP
CO2 SERPL-SCNC: 30 MMOL/L — SIGNIFICANT CHANGE UP (ref 22–31)
CREAT SERPL-MCNC: 0.88 MG/DL — SIGNIFICANT CHANGE UP (ref 0.5–1.3)
EGFR: 65 ML/MIN/1.73M2 — SIGNIFICANT CHANGE UP
EGFR: 65 ML/MIN/1.73M2 — SIGNIFICANT CHANGE UP
ESTIMATED AVERAGE GLUCOSE: 114 MG/DL — SIGNIFICANT CHANGE UP (ref 68–114)
GLUCOSE SERPL-MCNC: 102 MG/DL — HIGH (ref 70–99)
HCT VFR BLD CALC: 34.9 % — SIGNIFICANT CHANGE UP (ref 34.5–45)
HDLC SERPL-MCNC: 48 MG/DL — LOW
HGB BLD-MCNC: 10.4 G/DL — LOW (ref 11.5–15.5)
LDLC SERPL-MCNC: 51 MG/DL — SIGNIFICANT CHANGE UP
LIPID PNL WITH DIRECT LDL SERPL: 51 MG/DL — SIGNIFICANT CHANGE UP
MAGNESIUM SERPL-MCNC: 2 MG/DL — SIGNIFICANT CHANGE UP (ref 1.6–2.6)
MCHC RBC-ENTMCNC: 23.2 PG — LOW (ref 27–34)
MCHC RBC-ENTMCNC: 29.8 G/DL — LOW (ref 32–36)
MCV RBC AUTO: 77.7 FL — LOW (ref 80–100)
NONHDLC SERPL-MCNC: 67 MG/DL — SIGNIFICANT CHANGE UP
NRBC # BLD AUTO: 0 K/UL — SIGNIFICANT CHANGE UP (ref 0–0)
NRBC # FLD: 0 K/UL — SIGNIFICANT CHANGE UP (ref 0–0)
NRBC BLD AUTO-RTO: 0 /100 WBCS — SIGNIFICANT CHANGE UP (ref 0–0)
PHOSPHATE SERPL-MCNC: 4.1 MG/DL — SIGNIFICANT CHANGE UP (ref 2.5–4.5)
PLATELET # BLD AUTO: 195 K/UL — SIGNIFICANT CHANGE UP (ref 150–400)
PMV BLD: 11.6 FL — SIGNIFICANT CHANGE UP (ref 7–13)
POTASSIUM SERPL-MCNC: 4.2 MMOL/L — SIGNIFICANT CHANGE UP (ref 3.5–5.3)
POTASSIUM SERPL-SCNC: 4.2 MMOL/L — SIGNIFICANT CHANGE UP (ref 3.5–5.3)
PROT SERPL-MCNC: 7.2 GM/DL — SIGNIFICANT CHANGE UP (ref 6–8.3)
RBC # BLD: 4.49 M/UL — SIGNIFICANT CHANGE UP (ref 3.8–5.2)
RBC # FLD: 17 % — HIGH (ref 10.3–14.5)
SODIUM SERPL-SCNC: 140 MMOL/L — SIGNIFICANT CHANGE UP (ref 135–145)
T4 FREE SERPL-MCNC: 1.05 NG/DL — SIGNIFICANT CHANGE UP (ref 0.76–1.46)
TRIGL SERPL-MCNC: 81 MG/DL — SIGNIFICANT CHANGE UP
TSH SERPL-MCNC: 5.16 UU/ML — HIGH (ref 0.34–4.82)
WBC # BLD: 6.37 K/UL — SIGNIFICANT CHANGE UP (ref 3.8–10.5)
WBC # FLD AUTO: 6.37 K/UL — SIGNIFICANT CHANGE UP (ref 3.8–10.5)

## 2025-06-21 PROCEDURE — 99222 1ST HOSP IP/OBS MODERATE 55: CPT | Mod: 25

## 2025-06-21 PROCEDURE — 99291 CRITICAL CARE FIRST HOUR: CPT

## 2025-06-21 PROCEDURE — 93306 TTE W/DOPPLER COMPLETE: CPT | Mod: 26

## 2025-06-21 RX ORDER — ESCITALOPRAM OXALATE 20 MG/1
10 TABLET ORAL DAILY
Refills: 0 | Status: DISCONTINUED | OUTPATIENT
Start: 2025-06-21 | End: 2025-06-22

## 2025-06-21 RX ORDER — ALBUTEROL SULFATE 2.5 MG/3ML
2 VIAL, NEBULIZER (ML) INHALATION EVERY 6 HOURS
Refills: 0 | Status: DISCONTINUED | OUTPATIENT
Start: 2025-06-21 | End: 2025-06-22

## 2025-06-21 RX ORDER — FUROSEMIDE 10 MG/ML
1 INJECTION INTRAMUSCULAR; INTRAVENOUS
Refills: 0 | DISCHARGE

## 2025-06-21 RX ORDER — HEPARIN SODIUM 1000 [USP'U]/ML
5000 INJECTION INTRAVENOUS; SUBCUTANEOUS EVERY 12 HOURS
Refills: 0 | Status: DISCONTINUED | OUTPATIENT
Start: 2025-06-21 | End: 2025-06-22

## 2025-06-21 RX ORDER — ROSUVASTATIN CALCIUM 5 MG/1
20 TABLET, FILM COATED ORAL AT BEDTIME
Refills: 0 | Status: DISCONTINUED | OUTPATIENT
Start: 2025-06-21 | End: 2025-06-22

## 2025-06-21 RX ORDER — IPRATROPIUM BROMIDE AND ALBUTEROL SULFATE .5; 2.5 MG/3ML; MG/3ML
3 SOLUTION RESPIRATORY (INHALATION) EVERY 6 HOURS
Refills: 0 | Status: DISCONTINUED | OUTPATIENT
Start: 2025-06-21 | End: 2025-06-21

## 2025-06-21 RX ORDER — URSODIOL 300 MG/1
500 CAPSULE ORAL
Refills: 0 | Status: DISCONTINUED | OUTPATIENT
Start: 2025-06-21 | End: 2025-06-22

## 2025-06-21 RX ORDER — ROSUVASTATIN CALCIUM 5 MG/1
1 TABLET, FILM COATED ORAL
Refills: 0 | DISCHARGE

## 2025-06-21 RX ORDER — ESCITALOPRAM OXALATE 20 MG/1
1 TABLET ORAL
Refills: 0 | DISCHARGE

## 2025-06-21 RX ORDER — MAGNESIUM, ALUMINUM HYDROXIDE 200-200 MG
30 TABLET,CHEWABLE ORAL EVERY 4 HOURS
Refills: 0 | Status: DISCONTINUED | OUTPATIENT
Start: 2025-06-21 | End: 2025-06-22

## 2025-06-21 RX ORDER — FUROSEMIDE 10 MG/ML
20 INJECTION INTRAMUSCULAR; INTRAVENOUS DAILY
Refills: 0 | Status: DISCONTINUED | OUTPATIENT
Start: 2025-06-21 | End: 2025-06-22

## 2025-06-21 RX ORDER — MELATONIN 5 MG
3 TABLET ORAL AT BEDTIME
Refills: 0 | Status: DISCONTINUED | OUTPATIENT
Start: 2025-06-21 | End: 2025-06-22

## 2025-06-21 RX ORDER — ASPIRIN 325 MG
81 TABLET ORAL DAILY
Refills: 0 | Status: DISCONTINUED | OUTPATIENT
Start: 2025-06-21 | End: 2025-06-22

## 2025-06-21 RX ADMIN — ROSUVASTATIN CALCIUM 20 MILLIGRAM(S): 5 TABLET, FILM COATED ORAL at 21:47

## 2025-06-21 RX ADMIN — FUROSEMIDE 20 MILLIGRAM(S): 10 INJECTION INTRAMUSCULAR; INTRAVENOUS at 05:16

## 2025-06-21 RX ADMIN — Medication 81 MILLIGRAM(S): at 09:00

## 2025-06-21 RX ADMIN — HEPARIN SODIUM 5000 UNIT(S): 1000 INJECTION INTRAVENOUS; SUBCUTANEOUS at 21:48

## 2025-06-21 RX ADMIN — Medication 10 MILLIGRAM(S): at 02:17

## 2025-06-21 RX ADMIN — Medication 40 MILLIGRAM(S): at 05:16

## 2025-06-21 RX ADMIN — URSODIOL 500 MILLIGRAM(S): 300 CAPSULE ORAL at 21:47

## 2025-06-21 RX ADMIN — HEPARIN SODIUM 5000 UNIT(S): 1000 INJECTION INTRAVENOUS; SUBCUTANEOUS at 09:00

## 2025-06-21 RX ADMIN — ESCITALOPRAM OXALATE 10 MILLIGRAM(S): 20 TABLET ORAL at 09:00

## 2025-06-21 RX ADMIN — URSODIOL 500 MILLIGRAM(S): 300 CAPSULE ORAL at 08:59

## 2025-06-21 NOTE — PATIENT PROFILE ADULT - FALL HARM RISK - HARM RISK INTERVENTIONS

## 2025-06-21 NOTE — DISCHARGE NOTE NURSING/CASE MANAGEMENT/SOCIAL WORK - NSDCVIVACCINE_GEN_ALL_CORE_FT
influenza, high-dose, quadrivalent; 04-Sep-2022 10:37; Hyacinth Redmond (RN); [employer]; [id number] (Exp. Date: 30-Jun-2023); IntraMuscular; Deltoid Left.; 0.7 milliLiter(s); VIS (VIS Published: 06-Aug-2021, VIS Presented: 04-Sep-2022);

## 2025-06-21 NOTE — DIETITIAN INITIAL EVALUATION ADULT - ORAL INTAKE PTA/DIET HISTORY
Reports living alone and doing all of her food shopping and cooking.  She consumes 2 meals per day because she "is trying to lose weight".  Consumes approx 32oz H2O, 4oz wine daily and usually skips lunch.  Diet recall: B - 2 HB eggs, english muffing, OJ, banana, 2 cups regular coffee.; D - pasta with broccoli and oil or chicken cutlet(frozen) with rice.  She endorses a sedentary lifestyle.  Likely meting 50-75% ENN x > 1 mo.

## 2025-06-21 NOTE — H&P ADULT - NSHPPHYSICALEXAM_GEN_ALL_CORE
T(C): 36.2 (06-20-25 @ 23:41), Max: 36.7 (06-20-25 @ 14:43)  HR: 72 (06-20-25 @ 23:41) (69 - 80)  BP: 172/76 (06-20-25 @ 23:41) (160/71 - 179/67)  RR: 19 (06-20-25 @ 23:41) (18 - 19)  SpO2: 100% (06-20-25 @ 23:41) (90% - 100%)    General: non-toxic  HEENT: non-traumatic, perrla, eomi  Cardio: s1s2 regular rate and rhythm  Lungs: comfortable breathing, clear to auscultation  Abdomen: Soft, non-tender, non-distended  Neuro: AOx4  Ext: Pulses +2

## 2025-06-21 NOTE — DISCHARGE NOTE NURSING/CASE MANAGEMENT/SOCIAL WORK - FINANCIAL ASSISTANCE
[facility] provides services at a reduced cost to those who are determined to be eligible through [facility]’s financial assistance program. Information regarding [facility]’s financial assistance program can be found by going to [email or web address] or by calling [phone].

## 2025-06-21 NOTE — H&P ADULT - ASSESSMENT
85F admitted for r/o ACS.    #Chest pain  #r/o ACS  -Trop neg x2, BNP - 646  -ECG no ST changes.  -CT with pulmonary edema  -Pt on lasix but has never taken it.    -Resume her lasix.   -Obtain ECHO  -Cardio consult.     #Dyspepsia/GERD  -C/w her PPI     #COPD  -Not in exacerbation.  -Advair, duonebs.     #Anxiety and depression  -Lexapro    #DVT ppx: Hepsubq  Fall precautions.

## 2025-06-21 NOTE — DIETITIAN INITIAL EVALUATION ADULT - PERTINENT MEDS FT
MEDICATIONS  (STANDING):  aspirin enteric coated 81 milliGRAM(s) Oral daily  escitalopram 10 milliGRAM(s) Oral daily  fluticasone propionate/ salmeterol 250-50 MICROgram(s) Diskus 1 Dose(s) Inhalation two times a day  furosemide    Tablet 20 milliGRAM(s) Oral daily  heparin   Injectable 5000 Unit(s) SubCutaneous every 12 hours  pantoprazole    Tablet 40 milliGRAM(s) Oral before breakfast  rosuvastatin 20 milliGRAM(s) Oral at bedtime  ursodiol Tablet 500 milliGRAM(s) Oral two times a day    MEDICATIONS  (PRN):  acetaminophen     Tablet .. 650 milliGRAM(s) Oral once PRN Temp greater or equal to 38C (100.4F), Mild Pain (1 - 3)  albuterol    90 MICROgram(s) HFA Inhaler 2 Puff(s) Inhalation every 6 hours PRN Shortness of Breath and/or Wheezing  aluminum hydroxide/magnesium hydroxide/simethicone Suspension 30 milliLiter(s) Oral every 4 hours PRN Dyspepsia  melatonin 3 milliGRAM(s) Oral at bedtime PRN Insomnia  ondansetron Injectable 4 milliGRAM(s) IV Push once PRN Nausea and/or Vomiting

## 2025-06-21 NOTE — PROGRESS NOTE ADULT - ASSESSMENT
86 yo woman with HLD, emphysema, GERD, anxiety, spinal stenosis, presented for further evaluation of substernal chest tightness, excess belching. In the ED she was hypertensive, 170s/70s. HR WNL. Mildly hypoxic. No leukocytosis. Na 133. Troponin negative. . EKG rate 79. Normal sinus rhythm, On chest imaging there was mild pulmonary edema and small B/L pleural effusions. Admitted to Medicine for possible CHF.    Chest discomfort  Reassuringly, patient now quite asymptomatic. No sign of acute coronary syndrome. Serial troponin negative. EKG negative. Patient seen by Cardiology. Suggested gentle diuresis for 1 day since there was some mild pulmonary edema and small pleural effusions on chest imaging. No pneumonia. No PE. TTE done. Normal LV function. Left atrium moderately dilated. Mild aortic regurgitation. Mild mitral regurgitation. Mild to moderate tricuspid regurgitation. Estimated pulmonary artery systolic pressure is 39 mmHg, consistent with mild pulmonary hypertension. Continue aspirin, statin, furosemide. Follow up with Dr Shepard as outpatient    Emphysema  No sign of COPD exacerbation. No wheeze. No cough. Continue IC/LABA. Follow up with Dr. Louie as outpatient.     Hyponatremia  Mild. Will repeat labs and workup further if remains abnormal. Checked TSH, mildly elevated, requested free T4.     Anemia  Mild. Hgb ~10 and stable. Can work up further as outpatient    GERD  Continue PPI.    Anxiety  Continue escitalopram

## 2025-06-21 NOTE — H&P ADULT - HISTORY OF PRESENT ILLNESS
85F with hx of Depression, GERD presents to ED c/o substernal chest pain/chest tightness and excessive belching starting around 1200 today while at rest. She reports she had breakfast at 9 am, and while driving she felt sensation in her chest similar to her GERD symptoms. The symptoms were persistent due to which she went to . She was referred to ED for troponin testing. She denies any other complaints.

## 2025-06-21 NOTE — DIETITIAN INITIAL EVALUATION ADULT - PERTINENT LABORATORY DATA
06-21    140  |  105  |  14  ----------------------------<  102[H]  4.2   |  30  |  0.88    Ca    8.9      21 Jun 2025 06:59  Phos  4.1     06-21  Mg     2.0     06-21    TPro  7.2  /  Alb  3.4  /  TBili  0.4  /  DBili  x   /  AST  12[L]  /  ALT  11[L]  /  AlkPhos  96  06-21  A1C with Estimated Average Glucose Result: 5.6 % (06-21-25 @ 06:59)  A1C with Estimated Average Glucose Result: 6.0 % (07-22-24 @ 09:09)

## 2025-06-21 NOTE — CONSULT NOTE ADULT - SUBJECTIVE AND OBJECTIVE BOX
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  Admitted for chest pain w/ belching.   Trops neg, EKG show no acute change and no wall motion changes on TTE.  CT show small BL pleural effusions. BNP mildly elevated.  CP resolved.       ________________________________  Review of systems: A 10 point review of system has been performed, and is negative except for what has been mentioned in the above history of present illness.     PAST MEDICAL & SURGICAL HISTORY:  Idiopathic thrombocytopenic purpura      Acid reflux    Emphysema      Primary biliary cholangitis      Anxiety disorder      Pure hypercholesterolemia      Lymph nodes enlarged  in chest      Osteoarthritis      Mild emphysema      Spinal stenosis      Pulmonary nodule      S/P cholecystectomy  2011      Status post placement of implantable loop recorder  2014  removed Jan 2018      H/O coronary angiogram  2012      Status post cataract surgery  b/l      S/P colonoscopy        FAMILY HISTORY:  FH: prothrombin gene mutation         SOCIAL HISTORY: former smoker  ALLERGIES:  penicillamine (Rash (Mod to Severe))  sulfa drugs (Unknown)    Home Medications:  Advair HFA 45 mcg-21 mcg/inh inhalation aerosol: 2 puff(s) inhaled 2 times a day (20 Jun 2025 22:14)  Aspir 81 oral delayed release tablet: 1 tab(s) orally once a day (20 Jun 2025 22:14)  clindamycin 300 mg oral capsule: 1 cap(s) orally once a day for 7 days ***course not complete*** (20 Jun 2025 22:14)  escitalopram 10 mg oral tablet: 1 tab(s) orally once a day (20 Jun 2025 21:49)  Lasix 20 mg oral tablet: 1 tab(s) orally once a day (21 Jun 2025 01:48)  pantoprazole 40 mg oral delayed release tablet: 1 tab(s) orally once a day (20 Jun 2025 21:49)  rosuvastatin 20 mg oral tablet: 1 tab(s) orally once a day (in the evening) (21 Jun 2025 01:48)  ursodiol 500 mg oral tablet: 1 tab(s) orally 2 times a day (20 Jun 2025 21:49)    MEDICATIONS  (STANDING):  aspirin enteric coated 81 milliGRAM(s) Oral daily  escitalopram 10 milliGRAM(s) Oral daily  fluticasone propionate/ salmeterol 250-50 MICROgram(s) Diskus 1 Dose(s) Inhalation two times a day  furosemide    Tablet 20 milliGRAM(s) Oral daily  heparin   Injectable 5000 Unit(s) SubCutaneous every 12 hours  pantoprazole    Tablet 40 milliGRAM(s) Oral before breakfast  rosuvastatin 20 milliGRAM(s) Oral at bedtime  ursodiol Tablet 500 milliGRAM(s) Oral two times a day    MEDICATIONS  (PRN):  acetaminophen     Tablet .. 650 milliGRAM(s) Oral once PRN Temp greater or equal to 38C (100.4F), Mild Pain (1 - 3)  albuterol    90 MICROgram(s) HFA Inhaler 2 Puff(s) Inhalation every 6 hours PRN Shortness of Breath and/or Wheezing  aluminum hydroxide/magnesium hydroxide/simethicone Suspension 30 milliLiter(s) Oral every 4 hours PRN Dyspepsia  melatonin 3 milliGRAM(s) Oral at bedtime PRN Insomnia  ondansetron Injectable 4 milliGRAM(s) IV Push once PRN Nausea and/or Vomiting    Vital Signs Last 24 Hrs  T(C): 37 (21 Jun 2025 20:25), Max: 37.1 (21 Jun 2025 16:34)  T(F): 98.6 (21 Jun 2025 20:25), Max: 98.8 (21 Jun 2025 16:34)  HR: 79 (21 Jun 2025 20:25) (67 - 79)  BP: 145/60 (21 Jun 2025 20:25) (118/58 - 172/76)  BP(mean): 76 (21 Jun 2025 03:07) (76 - 96)  RR: 19 (21 Jun 2025 20:25) (17 - 19)  SpO2: 93% (21 Jun 2025 20:25) (93% - 100%)    Parameters below as of 21 Jun 2025 20:25  Patient On (Oxygen Delivery Method): room air      I&O's Summary    ________________________________  GENERAL APPEARANCE:  No acute distress  HEAD: normocephalic, atraumatic  NECK: supple, no jugular venous distention, no carotid bruit    HEART: Regular rate and rhythm, S1, S2 normal, 1/6 murmur    CHEST:  No anterior chest wall tenderness    LUNGS:  Clear to auscultation, without any wheezing, rhonchi or rales    ABDOMEN: soft, nontender, nondistended, with positive bowel sounds appreciated  EXTREMITIES: no edema.   NEURO: Alert and oriented x3  PSYC:  Normal affect  SKIN:  Dry  ________________________________   TELEMETRY: Sinus Rhythm     ECG: Sinus Rhythm - no sig ST T changes    LABS:                        10.4   6.37  )-----------( 195      ( 21 Jun 2025 06:59 )             34.9             06-21    140  |  105  |  14  ----------------------------<  102[H]  4.2   |  30  |  0.88    Ca    8.9      21 Jun 2025 06:59  Phos  4.1     06-21  Mg     2.0     06-21    TPro  7.2  /  Alb  3.4  /  TBili  0.4  /  DBili  x   /  AST  12[L]  /  ALT  11[L]  /  AlkPhos  96  06-21      Lipid Panel  Chl 116  HDL 48  LDL --  Trg 81  LIVER FUNCTIONS - ( 21 Jun 2025 06:59 )  Alb: 3.4 g/dL / Pro: 7.2 gm/dL / ALK PHOS: 96 U/L / ALT: 11 U/L / AST: 12 U/L / GGT: x           Urinalysis Basic - ( 21 Jun 2025 06:59 )    Color: x / Appearance: x / SG: x / pH: x  Gluc: 102 mg/dL / Ketone: x  / Bili: x / Urobili: x   Blood: x / Protein: x / Nitrite: x   Leuk Esterase: x / RBC: x / WBC x   Sq Epi: x / Non Sq Epi: x / Bacteria: x          Urinalysis Basic - ( 21 Jun 2025 06:59 )    Color: x / Appearance: x / SG: x / pH: x  Gluc: 102 mg/dL / Ketone: x  / Bili: x / Urobili: x   Blood: x / Protein: x / Nitrite: x   Leuk Esterase: x / RBC: x / WBC x   Sq Epi: x / Non Sq Epi: x / Bacteria: x             ________________________________    RADIOLOGY & ADDITIONAL STUDIES: IMPRESSION:  Mild pulmonary edema. Small bilateral pleural effusions.  No acute pathology in the abdomen and pelvis.    TTE 6/21/25   1. Left ventricular cavity is normal in size. Left ventricular wall thickness is normal. Left ventricular systolic function is normal with an ejection fraction visually estimated at 55 to 60 %.   2. Normal left ventricular diastolic function.   3. Left atrium is moderately dilated.   4. Mild mitral regurgitation.   5. Mild to moderate tricuspid regurgitation.   6. Estimated pulmonary artery systolic pressure is 39 mmHg, consistent with mild pulmonary hypertension.   7. Mild aortic regurgitation.    ________________________________    ASSESSMENT:  Chest pain w/ hx of non obs CAD on prior cath  HTN  HLD  Pleural effusion    PLAN:  In summary, this is a 85y Female with a past medical history of non obs CAD admitted for chest pain.  No evidence for ACS - trops neg, sx more consistent w/ GI etiology, EF normal on TTE and EKG show no acute change.  CT noted - no SOB. Cont oral lasix. Was Rx lasix as out patient.   Out patient SPECT stress test.  Cont stain - poss DC tomm if stable.    ____________________________________________  (Dragon Dictation software used). Thank you for allowing me to participate in the care of your patient. Please contact me should any questions arise.    ALVARO Moreno DO, [facility]  Office: [phone]

## 2025-06-21 NOTE — DIETITIAN INITIAL EVALUATION ADULT - ADD RECOMMEND
1) C/w regular diet as prescribed  2) Encourage protein-rich foods, maximize food preferences   3) MVI w/ minerals daily to ensure 100% RDA met   4) Obtain weekly wt to track/trend changes   5) Monitor bowel movements, if no BM for >3 days, consider implementing bowel regimen.   RD will continue to monitor PO intake, labs, hydration, and wt prn.   Nutrition recommendations to continue upon discharge. Goal to continue to meet >/= 80% ENN via tolerated route. RD to F/U prn for changes to nutrition dc plan.

## 2025-06-21 NOTE — DIETITIAN INITIAL EVALUATION ADULT - OTHER INFO
85F with hx of Depression, GERD presents to ED c/o substernal chest pain/chest tightness and excessive belching starting around 1200 today while at rest. She reports she had breakfast at 9 am, and while driving she felt sensation in her chest similar to her GERD symptoms. The symptoms were persistent due to which she went to . She was referred to ED for troponin testing. She denies any other complaints.   Admitted for chest pain    Prescribed a regular diet at , recommend to c/w diet as prescribed.  No weight history available for revw. Pt reports a UBW of ~ 220#.  Based on bed scale wt obtained by RD on 6/21 Pt wt is 219.6# - no change.  Pt appears obese, NFPE negative for findings.  RD provided verbal education on need to consume adequate PO intake from a well balanced diet and healthy weight loss, included recommendation for adequate hydration.  Discussed benefit of limiting sodium intake.  Pt was able to verbalize understanding.  See additional recommendations below.

## 2025-06-21 NOTE — PROGRESS NOTE ADULT - TIME BILLING
- Time spent coordinating the patient's care. This includes reviewing documentation pertinent to this admission, results and imaging. Further tests, medications, and procedures have been ordered as indicated. Laboratory results and the plan of care were communicated to the patient.  Discussed care plan with nursing and will discuss plan and care with appropriate consultant(s) such as Cardiology.

## 2025-06-21 NOTE — DISCHARGE NOTE NURSING/CASE MANAGEMENT/SOCIAL WORK - NSDCPNINST_GEN_ALL_CORE
Thank you for choosing [facility]. It has been our pleasure taking care of you. Please let us know if you have any questions, concerns or needs. For a complete copy of medical records please visit : [email or web address]

## 2025-06-21 NOTE — DISCHARGE NOTE NURSING/CASE MANAGEMENT/SOCIAL WORK - NSDCPEFALRISK_GEN_ALL_CORE
For information on Fall & Injury Prevention, visit: [email or web address] OR  [email or web address] OR  https://www.cdc.gov/steadi/patient.html

## 2025-06-21 NOTE — H&P ADULT - NSHPLABSRESULTS_GEN_ALL_CORE
LABS:  cret                        10.1   8.75  )-----------( 211      ( 20 Jun 2025 15:32 )             33.7     06-20    133[L]  |  101  |  17  ----------------------------<  103[H]  4.5   |  29  |  0.85    Ca    9.2      20 Jun 2025 15:32    TPro  7.8  /  Alb  3.6  /  TBili  0.4  /  DBili  x   /  AST  18  /  ALT  14  /  AlkPhos  108  06-20    < from: CT Chest w/ IV Cont (06.20.25 @ 19:12) >    IMPRESSION:  Mild pulmonary edema. Small bilateral pleural effusions.  No acute pathology in the abdomen and pelvis.          --- End of Report ---    < end of copied text >

## 2025-06-21 NOTE — DISCHARGE NOTE NURSING/CASE MANAGEMENT/SOCIAL WORK - PATIENT PORTAL LINK FT
You can access the FollowMyHealth Patient Portal offered by [facility] by registering at the following website: [email or web address]. By joining [employer]’s FollowMyHealth portal, you will also be able to view your health information using other applications (apps) compatible with our system.

## 2025-06-22 VITALS
TEMPERATURE: 98 F | HEART RATE: 75 BPM | DIASTOLIC BLOOD PRESSURE: 56 MMHG | RESPIRATION RATE: 18 BRPM | OXYGEN SATURATION: 94 % | SYSTOLIC BLOOD PRESSURE: 116 MMHG

## 2025-06-22 LAB
ANION GAP SERPL CALC-SCNC: 2 MMOL/L — LOW (ref 5–17)
BASOPHILS # BLD AUTO: 0.01 K/UL — SIGNIFICANT CHANGE UP (ref 0–0.2)
BASOPHILS NFR BLD AUTO: 0.2 % — SIGNIFICANT CHANGE UP (ref 0–2)
BUN SERPL-MCNC: 18 MG/DL — SIGNIFICANT CHANGE UP (ref 7–23)
CALCIUM SERPL-MCNC: 8.6 MG/DL — SIGNIFICANT CHANGE UP (ref 8.5–10.1)
CHLORIDE SERPL-SCNC: 105 MMOL/L — SIGNIFICANT CHANGE UP (ref 96–108)
CO2 SERPL-SCNC: 32 MMOL/L — HIGH (ref 22–31)
CREAT SERPL-MCNC: 0.92 MG/DL — SIGNIFICANT CHANGE UP (ref 0.5–1.3)
EGFR: 62 ML/MIN/1.73M2 — SIGNIFICANT CHANGE UP
EGFR: 62 ML/MIN/1.73M2 — SIGNIFICANT CHANGE UP
EOSINOPHIL # BLD AUTO: 0.22 K/UL — SIGNIFICANT CHANGE UP (ref 0–0.5)
EOSINOPHIL NFR BLD AUTO: 5 % — SIGNIFICANT CHANGE UP (ref 0–6)
GLUCOSE SERPL-MCNC: 97 MG/DL — SIGNIFICANT CHANGE UP (ref 70–99)
HCT VFR BLD CALC: 32.1 % — LOW (ref 34.5–45)
HGB BLD-MCNC: 9.6 G/DL — LOW (ref 11.5–15.5)
IMM GRANULOCYTES # BLD AUTO: 0.01 K/UL — SIGNIFICANT CHANGE UP (ref 0–0.07)
IMM GRANULOCYTES NFR BLD AUTO: 0.2 % — SIGNIFICANT CHANGE UP (ref 0–0.9)
LYMPHOCYTES # BLD AUTO: 0.79 K/UL — LOW (ref 1–3.3)
LYMPHOCYTES NFR BLD AUTO: 17.8 % — SIGNIFICANT CHANGE UP (ref 13–44)
MAGNESIUM SERPL-MCNC: 2.2 MG/DL — SIGNIFICANT CHANGE UP (ref 1.6–2.6)
MCHC RBC-ENTMCNC: 23.4 PG — LOW (ref 27–34)
MCHC RBC-ENTMCNC: 29.9 G/DL — LOW (ref 32–36)
MCV RBC AUTO: 78.1 FL — LOW (ref 80–100)
MONOCYTES # BLD AUTO: 0.42 K/UL — SIGNIFICANT CHANGE UP (ref 0–0.9)
MONOCYTES NFR BLD AUTO: 9.5 % — SIGNIFICANT CHANGE UP (ref 2–14)
NEUTROPHILS # BLD AUTO: 2.98 K/UL — SIGNIFICANT CHANGE UP (ref 1.8–7.4)
NEUTROPHILS NFR BLD AUTO: 67.3 % — SIGNIFICANT CHANGE UP (ref 43–77)
NRBC # BLD AUTO: 0 K/UL — SIGNIFICANT CHANGE UP (ref 0–0)
NRBC # FLD: 0 K/UL — SIGNIFICANT CHANGE UP (ref 0–0)
NRBC BLD AUTO-RTO: 0 /100 WBCS — SIGNIFICANT CHANGE UP (ref 0–0)
PLATELET # BLD AUTO: 176 K/UL — SIGNIFICANT CHANGE UP (ref 150–400)
PMV BLD: 11.2 FL — SIGNIFICANT CHANGE UP (ref 7–13)
POTASSIUM SERPL-MCNC: 4.6 MMOL/L — SIGNIFICANT CHANGE UP (ref 3.5–5.3)
POTASSIUM SERPL-SCNC: 4.6 MMOL/L — SIGNIFICANT CHANGE UP (ref 3.5–5.3)
RBC # BLD: 4.11 M/UL — SIGNIFICANT CHANGE UP (ref 3.8–5.2)
RBC # FLD: 17 % — HIGH (ref 10.3–14.5)
SODIUM SERPL-SCNC: 139 MMOL/L — SIGNIFICANT CHANGE UP (ref 135–145)
T4 FREE SERPL-MCNC: 0.97 NG/DL — SIGNIFICANT CHANGE UP (ref 0.76–1.46)
WBC # BLD: 4.43 K/UL — SIGNIFICANT CHANGE UP (ref 3.8–10.5)
WBC # FLD AUTO: 4.43 K/UL — SIGNIFICANT CHANGE UP (ref 3.8–10.5)

## 2025-06-22 PROCEDURE — 99239 HOSP IP/OBS DSCHRG MGMT >30: CPT

## 2025-06-22 RX ADMIN — Medication 1 DOSE(S): at 09:03

## 2025-06-22 RX ADMIN — Medication 81 MILLIGRAM(S): at 09:45

## 2025-06-22 RX ADMIN — URSODIOL 500 MILLIGRAM(S): 300 CAPSULE ORAL at 09:44

## 2025-06-22 RX ADMIN — FUROSEMIDE 20 MILLIGRAM(S): 10 INJECTION INTRAMUSCULAR; INTRAVENOUS at 05:29

## 2025-06-22 RX ADMIN — ESCITALOPRAM OXALATE 10 MILLIGRAM(S): 20 TABLET ORAL at 09:44

## 2025-06-22 RX ADMIN — HEPARIN SODIUM 5000 UNIT(S): 1000 INJECTION INTRAVENOUS; SUBCUTANEOUS at 09:45

## 2025-06-22 RX ADMIN — Medication 40 MILLIGRAM(S): at 05:29

## 2025-06-22 NOTE — DISCHARGE NOTE PROVIDER - CARE PROVIDER_API CALL
Juan Ramon Neumann  Internal Medicine  [address] [place] [address]  Phone: [phone]  Fax: [phone]  Follow Up Time: 1-3 days    Croy Shepard  Cardiovascular Disease  [address] [place] [address]  Phone: [phone]  Fax: [phone]  Follow Up Time: 1-3 days

## 2025-06-22 NOTE — DISCHARGE NOTE PROVIDER - NSDCCPCAREPLAN_GEN_ALL_CORE_FT
PRINCIPAL DISCHARGE DIAGNOSIS  Diagnosis: Chest pain  Assessment and Plan of Treatment: no MI  f/u with your PCP/cardio      SECONDARY DISCHARGE DIAGNOSES  Diagnosis: Hypoxia  Assessment and Plan of Treatment: resolved  does not need home O2      Diagnosis: Pulmonary edema  Assessment and Plan of Treatment: take lasix  low salt diet

## 2025-06-22 NOTE — DISCHARGE NOTE PROVIDER - HOSPITAL COURSE
PHYSICAL EXAM:    Daily     Daily Weight in k.4 (2025 06:27)    Vital Signs Last 24 Hrs  T(C): 36.4 (2025 11:58), Max: 37.1 (2025 16:34)  T(F): 97.5 (2025 11:58), Max: 98.8 (2025 16:34)  HR: 75 (2025 11:58) (73 - 85)  BP: 116/56 (2025 11:58) (109/45 - 151/66)  BP(mean): 74 (2025 11:58) (74 - 74)  RR: 18 (2025 11:58) (18 - 91)  SpO2: 94% (2025 11:58) (93% - 97%)    Constitutional: Well  appearing  HEENT: Atraumatic, ANABELLA, Normal, No congestion  Respiratory: Breath Sounds normal, no rhonchi/wheeze  Cardiovascular: N S1S2;   Gastrointestinal: Abdomen soft, non tender, Bowel Sounds present  Extremities: No edema, peripheral pulses present  Neurological: AAO x 3, no gross focal motor deficits  Skin: Non cellulitic, no rash, ulcers  Lymph Nodes: No lymphadenopathy noted  Back: No CVA tenderness   Musculoskeletal: non tender  Breasts: Deferred  Genitourinary: deferred  Rectal: Deferred      84 yo woman with HLD, emphysema, GERD, anxiety, spinal stenosis, presented for further evaluation of substernal chest tightness, excess belching. In the ED she was hypertensive, 170s/70s. HR WNL. Mildly hypoxic. No leukocytosis. Na 133. Troponin negative. . EKG rate 79. Normal sinus rhythm, On chest imaging there was mild pulmonary edema and small B/L pleural effusions. Admitted to Medicine for possible CHF.     Chest discomfort  Reassuringly, patient now quite asymptomatic. No sign of acute coronary syndrome. Serial troponin negative. EKG negative. Patient seen by Cardiology. Suggested gentle diuresis for 1 day since there was some mild pulmonary edema and small pleural effusions on chest imaging. No pneumonia. No PE. TTE done. Normal LV function. Left atrium moderately dilated. Mild aortic regurgitation. Mild mitral regurgitation. Mild to moderate tricuspid regurgitation. Estimated pulmonary artery systolic pressure is 39 mmHg, consistent with mild pulmonary hypertension. Continue aspirin, statin, furosemide. Follow up with Dr Shepard as outpatient  cont lasix  does not need home O2    Emphysema  No sign of COPD exacerbation. No wheeze. No cough. Continue IC/LABA. Follow up with Dr. Louie as outpatient.     Hyponatremia  Mild. Will repeat labs and workup further if remains abnormal. Checked TSH, mildly elevated, requested free T4.     Anemia  Mild. Hgb ~10 and stable. Can work up further as outpatient    GERD  Continue PPI.    Anxiety  Continue escitalopram     eager to go home    d/c home    time spent 35 min

## 2025-06-22 NOTE — DISCHARGE NOTE PROVIDER - PROVIDER TOKENS
PROVIDER:[TOKEN:[[id number]],FOLLOWUP:[1-3 days]],PROVIDER:[TOKEN:[[id number]],FOLLOWUP:[1-3 days]]

## 2025-06-22 NOTE — DISCHARGE NOTE PROVIDER - NSDCMRMEDTOKEN_GEN_ALL_CORE_FT
Advair HFA 45 mcg-21 mcg/inh inhalation aerosol: 2 puff(s) inhaled 2 times a day  Aspir 81 oral delayed release tablet: 1 tab(s) orally once a day  clindamycin 300 mg oral capsule: 1 cap(s) orally once a day for 7 days ***course not complete***  escitalopram 10 mg oral tablet: 1 tab(s) orally once a day  Lasix 20 mg oral tablet: 1 tab(s) orally once a day  pantoprazole 40 mg oral delayed release tablet: 1 tab(s) orally once a day  rosuvastatin 20 mg oral tablet: 1 tab(s) orally once a day (in the evening)  ursodiol 500 mg oral tablet: 1 tab(s) orally 2 times a day

## 2025-06-22 NOTE — DISCHARGE NOTE PROVIDER - CARE PROVIDERS DIRECT ADDRESSES
[email or web address] CenterGuard [email or web address] Center[facility] [employer]

## 2025-06-26 DIAGNOSIS — J43.9 EMPHYSEMA, UNSPECIFIED: ICD-10-CM

## 2025-06-26 DIAGNOSIS — E87.1 HYPO-OSMOLALITY AND HYPONATREMIA: ICD-10-CM

## 2025-06-26 DIAGNOSIS — R91.1 SOLITARY PULMONARY NODULE: ICD-10-CM

## 2025-06-26 DIAGNOSIS — F32.A DEPRESSION, UNSPECIFIED: ICD-10-CM

## 2025-06-26 DIAGNOSIS — Z79.899 OTHER LONG TERM (CURRENT) DRUG THERAPY: ICD-10-CM

## 2025-06-26 DIAGNOSIS — D69.3 IMMUNE THROMBOCYTOPENIC PURPURA: ICD-10-CM

## 2025-06-26 DIAGNOSIS — R09.02 HYPOXEMIA: ICD-10-CM

## 2025-06-26 DIAGNOSIS — D64.9 ANEMIA, UNSPECIFIED: ICD-10-CM

## 2025-06-26 DIAGNOSIS — Z88.2 ALLERGY STATUS TO SULFONAMIDES: ICD-10-CM

## 2025-06-26 DIAGNOSIS — I08.3 COMBINED RHEUMATIC DISORDERS OF MITRAL, AORTIC AND TRICUSPID VALVES: ICD-10-CM

## 2025-06-26 DIAGNOSIS — K21.9 GASTRO-ESOPHAGEAL REFLUX DISEASE WITHOUT ESOPHAGITIS: ICD-10-CM

## 2025-06-26 DIAGNOSIS — Z87.891 PERSONAL HISTORY OF NICOTINE DEPENDENCE: ICD-10-CM

## 2025-06-26 DIAGNOSIS — J81.1 CHRONIC PULMONARY EDEMA: ICD-10-CM

## 2025-06-26 DIAGNOSIS — R07.9 CHEST PAIN, UNSPECIFIED: ICD-10-CM

## 2025-06-26 DIAGNOSIS — E78.00 PURE HYPERCHOLESTEROLEMIA, UNSPECIFIED: ICD-10-CM

## 2025-06-26 DIAGNOSIS — Z88.0 ALLERGY STATUS TO PENICILLIN: ICD-10-CM

## 2025-06-26 DIAGNOSIS — R10.13 EPIGASTRIC PAIN: ICD-10-CM

## 2025-06-26 DIAGNOSIS — M19.90 UNSPECIFIED OSTEOARTHRITIS, UNSPECIFIED SITE: ICD-10-CM

## 2025-06-26 DIAGNOSIS — Z79.52 LONG TERM (CURRENT) USE OF SYSTEMIC STEROIDS: ICD-10-CM

## 2025-06-26 DIAGNOSIS — I27.20 PULMONARY HYPERTENSION, UNSPECIFIED: ICD-10-CM
